# Patient Record
Sex: MALE | Race: WHITE | NOT HISPANIC OR LATINO | Employment: OTHER | URBAN - METROPOLITAN AREA
[De-identification: names, ages, dates, MRNs, and addresses within clinical notes are randomized per-mention and may not be internally consistent; named-entity substitution may affect disease eponyms.]

---

## 2020-07-10 ENCOUNTER — LAB (OUTPATIENT)
Dept: LAB | Facility: HOSPITAL | Age: 45
End: 2020-07-10

## 2020-07-10 ENCOUNTER — OFFICE VISIT (OUTPATIENT)
Dept: FAMILY MEDICINE CLINIC | Facility: CLINIC | Age: 45
End: 2020-07-10

## 2020-07-10 VITALS
DIASTOLIC BLOOD PRESSURE: 80 MMHG | HEART RATE: 88 BPM | BODY MASS INDEX: 28.66 KG/M2 | SYSTOLIC BLOOD PRESSURE: 132 MMHG | OXYGEN SATURATION: 96 % | WEIGHT: 200.2 LBS | HEIGHT: 70 IN

## 2020-07-10 DIAGNOSIS — Z00.00 PHYSICAL EXAM, ANNUAL: ICD-10-CM

## 2020-07-10 DIAGNOSIS — F33.1 MODERATE EPISODE OF RECURRENT MAJOR DEPRESSIVE DISORDER (HCC): ICD-10-CM

## 2020-07-10 DIAGNOSIS — F51.01 PRIMARY INSOMNIA: ICD-10-CM

## 2020-07-10 DIAGNOSIS — Z76.89 ENCOUNTER TO ESTABLISH CARE: Primary | ICD-10-CM

## 2020-07-10 DIAGNOSIS — K21.9 GASTROESOPHAGEAL REFLUX DISEASE, ESOPHAGITIS PRESENCE NOT SPECIFIED: ICD-10-CM

## 2020-07-10 DIAGNOSIS — Z11.59 ENCOUNTER FOR HEPATITIS C SCREENING TEST FOR LOW RISK PATIENT: ICD-10-CM

## 2020-07-10 DIAGNOSIS — E55.9 VITAMIN D DEFICIENCY: ICD-10-CM

## 2020-07-10 DIAGNOSIS — M17.0 PRIMARY OSTEOARTHRITIS OF BOTH KNEES: ICD-10-CM

## 2020-07-10 LAB
25(OH)D3 SERPL-MCNC: 18.7 NG/ML (ref 30–100)
ALBUMIN SERPL-MCNC: 4.7 G/DL (ref 3.5–5.2)
ALBUMIN/GLOB SERPL: 1.5 G/DL
ALP SERPL-CCNC: 104 U/L (ref 39–117)
ALT SERPL W P-5'-P-CCNC: 19 U/L (ref 1–41)
ANION GAP SERPL CALCULATED.3IONS-SCNC: 12.4 MMOL/L (ref 5–15)
AST SERPL-CCNC: 16 U/L (ref 1–40)
BASOPHILS # BLD AUTO: 0.05 10*3/MM3 (ref 0–0.2)
BASOPHILS NFR BLD AUTO: 0.6 % (ref 0–1.5)
BILIRUB SERPL-MCNC: 0.6 MG/DL (ref 0–1.2)
BUN SERPL-MCNC: 9 MG/DL (ref 6–20)
BUN/CREAT SERPL: 9.3 (ref 7–25)
CALCIUM SPEC-SCNC: 10.1 MG/DL (ref 8.6–10.5)
CHLORIDE SERPL-SCNC: 104 MMOL/L (ref 98–107)
CHOLEST SERPL-MCNC: 150 MG/DL (ref 0–200)
CO2 SERPL-SCNC: 25.6 MMOL/L (ref 22–29)
CREAT SERPL-MCNC: 0.97 MG/DL (ref 0.76–1.27)
DEPRECATED RDW RBC AUTO: 43 FL (ref 37–54)
EOSINOPHIL # BLD AUTO: 0.1 10*3/MM3 (ref 0–0.4)
EOSINOPHIL NFR BLD AUTO: 1.2 % (ref 0.3–6.2)
ERYTHROCYTE [DISTWIDTH] IN BLOOD BY AUTOMATED COUNT: 12.7 % (ref 12.3–15.4)
GFR SERPL CREATININE-BSD FRML MDRD: 84 ML/MIN/1.73
GLOBULIN UR ELPH-MCNC: 3.1 GM/DL
GLUCOSE SERPL-MCNC: 101 MG/DL (ref 65–99)
HCT VFR BLD AUTO: 50.3 % (ref 37.5–51)
HCV AB SER DONR QL: NORMAL
HDLC SERPL-MCNC: 27 MG/DL (ref 40–60)
HGB BLD-MCNC: 17.3 G/DL (ref 13–17.7)
IMM GRANULOCYTES # BLD AUTO: 0.03 10*3/MM3 (ref 0–0.05)
IMM GRANULOCYTES NFR BLD AUTO: 0.4 % (ref 0–0.5)
LDLC SERPL CALC-MCNC: 95 MG/DL (ref 0–100)
LDLC/HDLC SERPL: 3.52 {RATIO}
LYMPHOCYTES # BLD AUTO: 1.3 10*3/MM3 (ref 0.7–3.1)
LYMPHOCYTES NFR BLD AUTO: 16.1 % (ref 19.6–45.3)
MCH RBC QN AUTO: 31.6 PG (ref 26.6–33)
MCHC RBC AUTO-ENTMCNC: 34.4 G/DL (ref 31.5–35.7)
MCV RBC AUTO: 92 FL (ref 79–97)
MONOCYTES # BLD AUTO: 0.63 10*3/MM3 (ref 0.1–0.9)
MONOCYTES NFR BLD AUTO: 7.8 % (ref 5–12)
NEUTROPHILS NFR BLD AUTO: 5.94 10*3/MM3 (ref 1.7–7)
NEUTROPHILS NFR BLD AUTO: 73.9 % (ref 42.7–76)
NRBC BLD AUTO-RTO: 0 /100 WBC (ref 0–0.2)
PLATELET # BLD AUTO: 324 10*3/MM3 (ref 140–450)
PMV BLD AUTO: 9.9 FL (ref 6–12)
POTASSIUM SERPL-SCNC: 4.1 MMOL/L (ref 3.5–5.2)
PROT SERPL-MCNC: 7.8 G/DL (ref 6–8.5)
RBC # BLD AUTO: 5.47 10*6/MM3 (ref 4.14–5.8)
SODIUM SERPL-SCNC: 142 MMOL/L (ref 136–145)
TRIGL SERPL-MCNC: 140 MG/DL (ref 0–150)
TSH SERPL DL<=0.05 MIU/L-ACNC: 1.16 UIU/ML (ref 0.27–4.2)
VLDLC SERPL-MCNC: 28 MG/DL (ref 5–40)
WBC # BLD AUTO: 8.05 10*3/MM3 (ref 3.4–10.8)

## 2020-07-10 PROCEDURE — 80053 COMPREHEN METABOLIC PANEL: CPT

## 2020-07-10 PROCEDURE — 36415 COLL VENOUS BLD VENIPUNCTURE: CPT

## 2020-07-10 PROCEDURE — 82306 VITAMIN D 25 HYDROXY: CPT

## 2020-07-10 PROCEDURE — 85025 COMPLETE CBC W/AUTO DIFF WBC: CPT

## 2020-07-10 PROCEDURE — 86803 HEPATITIS C AB TEST: CPT

## 2020-07-10 PROCEDURE — 84443 ASSAY THYROID STIM HORMONE: CPT

## 2020-07-10 PROCEDURE — 80061 LIPID PANEL: CPT

## 2020-07-10 PROCEDURE — 99204 OFFICE O/P NEW MOD 45 MIN: CPT | Performed by: PHYSICIAN ASSISTANT

## 2020-07-10 RX ORDER — OMEPRAZOLE 20 MG/1
20 CAPSULE, DELAYED RELEASE ORAL DAILY
COMMUNITY
End: 2021-02-26 | Stop reason: SDUPTHER

## 2020-07-10 RX ORDER — TRAZODONE HYDROCHLORIDE 50 MG/1
50 TABLET ORAL NIGHTLY
Qty: 30 TABLET | Refills: 1 | Status: SHIPPED | OUTPATIENT
Start: 2020-07-10 | End: 2020-08-03

## 2020-07-10 NOTE — PATIENT INSTRUCTIONS
0.5 tablet of the zoloft (25 mg) with dinner for 3 days and then increase to 1 tablet.    Take 1 tablet of trazodone 50 mg at night for sleep.  Take 30 minutes prior to bed.  Give yourself 8 hours to sleep.      Major Depressive Disorder, Adult  Major depressive disorder (MDD) is a mental health condition. It may also be called clinical depression or unipolar depression. MDD usually causes feelings of sadness, hopelessness, or helplessness. MDD can also cause physical symptoms. It can interfere with work, school, relationships, and other everyday activities. MDD may be mild, moderate, or severe. It may occur once (single episode major depressive disorder) or it may occur multiple times (recurrent major depressive disorder).  What are the causes?  The exact cause of this condition is not known. MDD is most likely caused by a combination of things, which may include:  · Genetic factors. These are traits that are passed along from parent to child.  · Individual factors. Your personality, your behavior, and the way you handle your thoughts and feelings may contribute to MDD. This includes personality traits and behaviors learned from others.  · Physical factors, such as:  ? Differences in the part of your brain that controls emotion. This part of your brain may be different than it is in people who do not have MDD.  ? Long-term (chronic) medical or psychiatric illnesses.  · Social factors. Traumatic experiences or major life changes may play a role in the development of MDD.  What increases the risk?  This condition is more likely to develop in women. The following factors may also make you more likely to develop MDD:  · A family history of depression.  · Troubled family relationships.  · Abnormally low levels of certain brain chemicals.  · Traumatic events in childhood, especially abuse or the loss of a parent.  · Being under a lot of stress, or long-term stress, especially from upsetting life experiences or  losses.  · A history of:  ? Chronic physical illness.  ? Other mental health disorders.  ? Substance abuse.  · Poor living conditions.  · Experiencing social exclusion or discrimination on a regular basis.  What are the signs or symptoms?  The main symptoms of MDD typically include:  · Constant depressed or irritable mood.  · Loss of interest in things and activities.  MDD symptoms may also include:  · Sleeping or eating too much or too little.  · Unexplained weight change.  · Fatigue or low energy.  · Feelings of worthlessness or guilt.  · Difficulty thinking clearly or making decisions.  · Thoughts of suicide or of harming others.  · Physical agitation or weakness.  · Isolation.  Severe cases of MDD may also occur with other symptoms, such as:  · Delusions or hallucinations, in which you imagine things that are not real (psychotic depression).  · Low-level depression that lasts at least a year (chronic depression or persistent depressive disorder).  · Extreme sadness and hopelessness (melancholic depression).  · Trouble speaking and moving (catatonic depression).  How is this diagnosed?  This condition may be diagnosed based on:  · Your symptoms.  · Your medical history, including your mental health history. This may involve tests to evaluate your mental health. You may be asked questions about your lifestyle, including any drug and alcohol use, and how long you have had symptoms of MDD.  · A physical exam.  · Blood tests to rule out other conditions.  You must have a depressed mood and at least four other MDD symptoms most of the day, nearly every day in the same 2-week timeframe before your health care provider can confirm a diagnosis of MDD.  How is this treated?  This condition is usually treated by mental health professionals, such as psychologists, psychiatrists, and clinical social workers. You may need more than one type of treatment. Treatment may include:  · Psychotherapy. This is also called talk  therapy or counseling. Types of psychotherapy include:  ? Cognitive behavioral therapy (CBT). This type of therapy teaches you to recognize unhealthy feelings, thoughts, and behaviors, and replace them with positive thoughts and actions.  ? Interpersonal therapy (IPT). This helps you to improve the way you relate to and communicate with others.  ? Family therapy. This treatment includes members of your family.  · Medicine to treat anxiety and depression, or to help you control certain emotions and behaviors.  · Lifestyle changes, such as:  ? Limiting alcohol and drug use.  ? Exercising regularly.  ? Getting plenty of sleep.  ? Making healthy eating choices.  ? Spending more time outdoors.    Treatments involving stimulation of the brain can be used in situations with extremely severe symptoms, or when medicine or other therapies do not work over time. These treatments include electroconvulsive therapy, transcranial magnetic stimulation, and vagal nerve stimulation.  Follow these instructions at home:  Activity  · Return to your normal activities as told by your health care provider.  · Exercise regularly and spend time outdoors as told by your health care provider.  General instructions  · Take over-the-counter and prescription medicines only as told by your health care provider.  · Do not drink alcohol. If you drink alcohol, limit your alcohol intake to no more than 1 drink a day for nonpregnant women and 2 drinks a day for men. One drink equals 12 oz of beer, 5 oz of wine, or 1½ oz of hard liquor. Alcohol can affect any antidepressant medicines you are taking. Talk to your health care provider about your alcohol use.  · Eat a healthy diet and get plenty of sleep.  · Find activities that you enjoy doing, and make time to do them.  · Consider joining a support group. Your health care provider may be able to recommend a support group.  · Keep all follow-up visits as told by your health care provider. This is  important.  Where to find more information  National [place] [employer] Mental Illness  · www.sue.org  [place] National [employer] of Mental Health  · www.[facility].nih.gov  National Suicide Prevention Lifeline  · 5-242-318-TALK (5374). This is free, 24-hour help.  Contact a health care provider if:  · Your symptoms get worse.  · You develop new symptoms.  Get help right away if:  · You self-harm.  · You have serious thoughts about hurting yourself or others.  · You see, hear, taste, smell, or feel things that are not present (hallucinate).  This information is not intended to replace advice given to you by your health care provider. Make sure you discuss any questions you have with your health care provider.  Document Released: 04/14/2014 Document Revised: 11/30/2018 Document Reviewed: 06/28/2017  Elsevier Patient Education © 2020 [employer]

## 2020-07-10 NOTE — PROGRESS NOTES
Chief Complaint   Patient presents with   • [facility]   • Depression     states that he lost the love of his life and best friend this year, she broke his heart. States that he is very sad, having trouble sleeping, bad dreams. Pt states that he has barely been eating. Interested in possibly seeing a therapist to have someone to talk too       HPI     Brock Bateman is a 44 y.o. male who presents to establish care.  He has not been to a primary care provider in years.  He reports that he broke up with the love of his life 10 days ago.  She is also his best friend for the last 21 years.  He is severely depressed, anxious and not sleeping.  He denies suicidal ideation as he is a [place] and against suicide.  He is planning on going to his friend's house to visit and will be gone for the next 2 weeks.  His past medical history is significant for knee arthritis, GERD and mild depression.  Reports mild depression for entire life.  Has never been treated for this.  He was seen by orthopedic surgeon at one point, no recommendations for his knees at that time.  He reports stable currently.  Has GERD which is stable and well-controlled with omeprazole.      Chief Complaint   Patient presents with   • [facility]   • Depression     states that he lost the love of his life and best friend this year, she broke his heart. States that he is very sad, having trouble sleeping, bad dreams. Pt states that he has barely been eating. Interested in possibly seeing a therapist to have someone to talk too       History reviewed. No pertinent past medical history.    History reviewed. No pertinent surgical history.    Family History   Problem Relation Age of Onset   • Acute myelogenous leukemia Mother    • Diabetes Mother    • Lung cancer Father         smoker   • Kidney disease Maternal Grandmother    • Colon cancer Neg Hx    • Prostate cancer Neg Hx        Social History     Socioeconomic History   • Marital status: Single      Spouse name: Not on file   • Number of children: Not on file   • Years of education: Not on file   • Highest education level: Not on file   Tobacco Use   • Smoking status: Current Every Day Smoker     Packs/day: 1.00     Years: 15.00     Pack years: 15.00     Types: Cigarettes   • Smokeless tobacco: Former User     Quit date: 06/2020   Substance and Sexual Activity   • Alcohol use: Not Currently     Comment: stopped 3/07   • Drug use: Never   • Sexual activity: Not Currently       No Known Allergies    ROS    Review of Systems   Constitutional: Positive for appetite change and fatigue. Negative for activity change, chills, diaphoresis, fever, unexpected weight gain and unexpected weight loss.   HENT: Negative for congestion, dental problem, ear pain, hearing loss, nosebleeds, sinus pressure, sore throat and trouble swallowing.    Eyes: Negative for blurred vision, pain, redness and visual disturbance.   Respiratory: Negative for apnea, cough, chest tightness, shortness of breath and wheezing.    Cardiovascular: Negative for chest pain, palpitations and leg swelling.   Gastrointestinal: Negative for abdominal distention, abdominal pain, anal bleeding, blood in stool, constipation, diarrhea, nausea, vomiting, GERD and indigestion.   Endocrine: Negative for cold intolerance, heat intolerance, polydipsia, polyphagia and polyuria.   Genitourinary: Negative for decreased urine volume, difficulty urinating, dysuria, frequency, hematuria, urgency and urinary incontinence.   Musculoskeletal: Positive for arthralgias. Negative for gait problem, joint swelling and bursitis.   Skin: Negative for dry skin, rash, skin lesions and bruise.   Neurological: Negative for dizziness, tremors, seizures, syncope, speech difficulty, weakness, light-headedness, headache, memory problem and confusion.   Hematological: Does not bruise/bleed easily.   Psychiatric/Behavioral: Positive for agitation, behavioral problems, sleep disturbance,  "depressed mood and stress. Negative for decreased concentration, hallucinations, self-injury and suicidal ideas. The patient is nervous/anxious.        Vitals:    07/10/20 0925   BP: 132/80   BP Location: Left arm   Patient Position: Sitting   Cuff Size: Adult   Pulse: 88   SpO2: 96%   Weight: 90.8 kg (200 lb 3.2 oz)   Height: 177.8 cm (70\")     Body mass index is 28.73 kg/m².    Current Outpatient Medications on File Prior to Visit   Medication Sig Dispense Refill   • omeprazole (priLOSEC) 20 MG capsule Take 20 mg by mouth Daily.       No current facility-administered medications on file prior to visit.        No results found for this or any previous visit.    PE  Physical Exam   Constitutional: Vital signs are normal. He appears well-developed and well-nourished. He is active and cooperative. He does not appear ill. No distress. He appears overweight.   HENT:   Head: Normocephalic and atraumatic.   Eyes: EOM are normal.   Neck: Normal range of motion.   Cardiovascular: Normal rate, regular rhythm and normal heart sounds.   Pulmonary/Chest: Effort normal and breath sounds normal.   Musculoskeletal: Normal range of motion.   Neurological: He is alert.   Skin: Skin is warm. He is not diaphoretic. No erythema.   Psychiatric: His speech is normal. Judgment and thought content normal. His mood appears anxious. He is agitated. He is not actively hallucinating. Cognition and memory are normal. He exhibits a depressed mood.   Tearful during appointment He is attentive.       A/P    Brock was seen today for establish care and depression.    Diagnoses and all orders for this visit:    Encounter to establish care    Moderate episode of recurrent major depressive disorder (CMS/HCC)  -     Ambulatory Referral to Psychiatry  -     sertraline (ZOLOFT) 50 MG tablet; Take 1 tablet by mouth Daily.  Reports mild depression his entire life.  Reactive depression with recent break-up 10 days ago.  No suicidal ideation, Orthodox and " this goes against his beliefs.  Discussed ways to manage depression and anxiety.  Exercise, sleep, prayer, meditation, talking with friends/family and gratitude list.  Refer to psychiatry for counseling and medication management.  Start on zoloft 50 mg daily.  Return in 4 weeks.  Call sooner if he has concerns or questions.    Primary insomnia  -     traZODone (DESYREL) 50 MG tablet; Take 1 tablet by mouth Every Night.  Not sleeping at all.  Has tried melatonin without benefit.  Will trial trazodone 50 mg nightly.    Osteoarthritis of both knees  Stable currently.  Seen by orthopedic surgeon at one point, no recommendations then.    GERD  Stable, well-controlled with omeprazole.    Vitamin D deficiency  -     Vitamin D 25 Hydroxy; Future    Physical exam, annual  -     CBC Auto Differential; Future  -     Comprehensive Metabolic Panel; Future  -     TSH Rfx On Abnormal To Free T4; Future  -     Lipid Panel; Future  Return in 4 weeks for APE.  Labs ordered today.    Encounter for hepatitis C screening test for low risk patient  -     Hepatitis C Antibody; Future         Plan of care reviewed with patient at the conclusion of today's visit. Education was provided regarding diagnosis, management and any prescribed or recommended OTC medications.  Patient verbalizes understanding of and agreement with management plan.    Return in about 4 weeks (around 8/7/2020) for Annual physical.     Kelley Tang PA-C

## 2020-07-13 DIAGNOSIS — E55.9 VITAMIN D DEFICIENCY: Primary | ICD-10-CM

## 2020-08-03 ENCOUNTER — OFFICE VISIT (OUTPATIENT)
Dept: FAMILY MEDICINE CLINIC | Facility: CLINIC | Age: 45
End: 2020-08-03

## 2020-08-03 VITALS
BODY MASS INDEX: 28.55 KG/M2 | OXYGEN SATURATION: 98 % | SYSTOLIC BLOOD PRESSURE: 130 MMHG | HEIGHT: 70 IN | HEART RATE: 75 BPM | DIASTOLIC BLOOD PRESSURE: 82 MMHG | WEIGHT: 199.4 LBS

## 2020-08-03 DIAGNOSIS — F33.1 MODERATE EPISODE OF RECURRENT MAJOR DEPRESSIVE DISORDER (HCC): ICD-10-CM

## 2020-08-03 DIAGNOSIS — Z23 NEED FOR TDAP VACCINATION: ICD-10-CM

## 2020-08-03 DIAGNOSIS — Z23 NEED FOR PROPHYLACTIC VACCINATION AGAINST STREPTOCOCCUS PNEUMONIAE (PNEUMOCOCCUS): ICD-10-CM

## 2020-08-03 DIAGNOSIS — Z00.00 PHYSICAL EXAM, ANNUAL: Primary | ICD-10-CM

## 2020-08-03 PROCEDURE — 90472 IMMUNIZATION ADMIN EACH ADD: CPT | Performed by: PHYSICIAN ASSISTANT

## 2020-08-03 PROCEDURE — 90471 IMMUNIZATION ADMIN: CPT | Performed by: PHYSICIAN ASSISTANT

## 2020-08-03 PROCEDURE — 99396 PREV VISIT EST AGE 40-64: CPT | Performed by: PHYSICIAN ASSISTANT

## 2020-08-03 PROCEDURE — 90715 TDAP VACCINE 7 YRS/> IM: CPT | Performed by: PHYSICIAN ASSISTANT

## 2020-08-03 PROCEDURE — 90732 PPSV23 VACC 2 YRS+ SUBQ/IM: CPT | Performed by: PHYSICIAN ASSISTANT

## 2020-08-03 RX ORDER — SERTRALINE HYDROCHLORIDE 25 MG/1
25 TABLET, FILM COATED ORAL DAILY
Qty: 90 TABLET | Refills: 1 | Status: SHIPPED | OUTPATIENT
Start: 2020-08-03 | End: 2021-02-26

## 2020-08-03 NOTE — PROGRESS NOTES
Patient Care Team:  Kelley Tang PA-C as PCP - General (Physician Assistant)     Chief complaint: Patient is in today for a physical     Peter Catie Bateman is a 45 y.o. male who presents for his yearly physical exam.     Patient presents for annual physical exam.  He was recently started on sertraline 50 mg daily for depression and anxiety.  He was also started on trazodone 50 mg nightly to help with sleep.  He reports that he was not able to tolerate the trazodone as it made him more agitated at night.  He has had similar symptoms in the past with other sleep aids.  He is currently taking sertraline 25 mg because he felt too overmedicated at the higher dose.  This is working well for him and he is tolerating it well.  He is going to counseling and is finding this useful.  Overall he seems like he is doing much better and he states that he feels much better than when he was initially seen.  He has no suicidal ideation.  He is otherwise doing well and is healthy.  He is due for dental and eye exam.  He denies any concerning skin lesions.  Needs pneumonia vaccine.  Flu vaccine encouraged in the fall.       Review of Systems   Constitutional: Negative for chills, diaphoresis, fatigue and fever.   HENT: Negative for congestion, ear pain, hearing loss, postnasal drip, rhinorrhea and sore throat.    Eyes: Negative for blurred vision, pain and visual disturbance.   Respiratory: Negative for cough, shortness of breath and wheezing.    Cardiovascular: Negative for chest pain and leg swelling.   Gastrointestinal: Negative for abdominal pain, blood in stool, constipation, diarrhea, nausea, vomiting and indigestion.   Endocrine: Negative for polyuria.   Genitourinary: Negative for dysuria, flank pain and hematuria.   Musculoskeletal: Negative for arthralgias, gait problem and myalgias.   Skin: Negative for rash and skin lesions.   Neurological: Negative for dizziness, weakness, light-headedness, numbness and  headache.   Psychiatric/Behavioral: Positive for stress. Negative for self-injury, sleep disturbance, suicidal ideas and depressed mood. The patient is not nervous/anxious.         History  History reviewed. No pertinent past medical history.   History reviewed. No pertinent surgical history.   Allergies   Allergen Reactions   • Trazodone Other (See Comments)     Caused insomnia, blurred vision      Family History   Problem Relation Age of Onset   • Acute myelogenous leukemia Mother    • Diabetes Mother    • Lung cancer Father         smoker   • Kidney disease Maternal Grandmother    • Colon cancer Neg Hx    • Prostate cancer Neg Hx       Social History     Socioeconomic History   • Marital status: Single     Spouse name: Not on file   • Number of children: Not on file   • Years of education: Not on file   • Highest education level: Not on file   Tobacco Use   • Smoking status: Current Every Day Smoker     Packs/day: 1.00     Years: 15.00     Pack years: 15.00     Types: Cigarettes   • Smokeless tobacco: Former User     Quit date: 06/2020   Substance and Sexual Activity   • Alcohol use: Not Currently     Comment: stopped 3/07   • Drug use: Never   • Sexual activity: Not Currently      Current Outpatient Medications on File Prior to Visit   Medication Sig Dispense Refill   • cholecalciferol (VITAMIN D3) 1.25 MG (70576 UT) capsule Take 1 capsule by mouth 1 (One) Time Per Week. 12 capsule 1   • omeprazole (priLOSEC) 20 MG capsule Take 20 mg by mouth Daily.     • [DISCONTINUED] sertraline (ZOLOFT) 50 MG tablet Take 1 tablet by mouth Daily. 30 tablet 1   • [DISCONTINUED] traZODone (DESYREL) 50 MG tablet Take 1 tablet by mouth Every Night. 30 tablet 1     No current facility-administered medications on file prior to visit.        Results for orders placed or performed in visit on 07/10/20   CBC Auto Differential   Result Value Ref Range    WBC 8.05 3.40 - 10.80 10*3/mm3    RBC 5.47 4.14 - 5.80 10*6/mm3    Hemoglobin 17.3  13.0 - 17.7 g/dL    Hematocrit 50.3 37.5 - 51.0 %    MCV 92.0 79.0 - 97.0 fL    MCH 31.6 26.6 - 33.0 pg    MCHC 34.4 31.5 - 35.7 g/dL    RDW 12.7 12.3 - 15.4 %    RDW-SD 43.0 37.0 - 54.0 fl    MPV 9.9 6.0 - 12.0 fL    Platelets 324 140 - 450 10*3/mm3    Neutrophil % 73.9 42.7 - 76.0 %    Lymphocyte % 16.1 (L) 19.6 - 45.3 %    Monocyte % 7.8 5.0 - 12.0 %    Eosinophil % 1.2 0.3 - 6.2 %    Basophil % 0.6 0.0 - 1.5 %    Immature Grans % 0.4 0.0 - 0.5 %    Neutrophils, Absolute 5.94 1.70 - 7.00 10*3/mm3    Lymphocytes, Absolute 1.30 0.70 - 3.10 10*3/mm3    Monocytes, Absolute 0.63 0.10 - 0.90 10*3/mm3    Eosinophils, Absolute 0.10 0.00 - 0.40 10*3/mm3    Basophils, Absolute 0.05 0.00 - 0.20 10*3/mm3    Immature Grans, Absolute 0.03 0.00 - 0.05 10*3/mm3    nRBC 0.0 0.0 - 0.2 /100 WBC   Comprehensive Metabolic Panel   Result Value Ref Range    Glucose 101 (H) 65 - 99 mg/dL    BUN 9 6 - 20 mg/dL    Creatinine 0.97 0.76 - 1.27 mg/dL    Sodium 142 136 - 145 mmol/L    Potassium 4.1 3.5 - 5.2 mmol/L    Chloride 104 98 - 107 mmol/L    CO2 25.6 22.0 - 29.0 mmol/L    Calcium 10.1 8.6 - 10.5 mg/dL    Total Protein 7.8 6.0 - 8.5 g/dL    Albumin 4.70 3.50 - 5.20 g/dL    ALT (SGPT) 19 1 - 41 U/L    AST (SGOT) 16 1 - 40 U/L    Alkaline Phosphatase 104 39 - 117 U/L    Total Bilirubin 0.6 0.0 - 1.2 mg/dL    eGFR Non African Amer 84 >60 mL/min/1.73    Globulin 3.1 gm/dL    A/G Ratio 1.5 g/dL    BUN/Creatinine Ratio 9.3 7.0 - 25.0    Anion Gap 12.4 5.0 - 15.0 mmol/L   TSH Rfx On Abnormal To Free T4   Result Value Ref Range    TSH 1.160 0.270 - 4.200 uIU/mL   Lipid Panel   Result Value Ref Range    Total Cholesterol 150 0 - 200 mg/dL    Triglycerides 140 0 - 150 mg/dL    HDL Cholesterol 27 (L) 40 - 60 mg/dL    LDL Cholesterol  95 0 - 100 mg/dL    VLDL Cholesterol 28 5 - 40 mg/dL    LDL/HDL Ratio 3.52    Hepatitis C Antibody   Result Value Ref Range    Hepatitis C Ab Non-Reactive Non-Reactive   Vitamin D 25 Hydroxy   Result Value Ref Range     25 Hydroxy, Vitamin D 18.7 (L) 30.0 - 100.0 ng/ml       Health Maintenance   Topic Date Due   • TDAP/TD VACCINES (1 - Tdap) 1987   • PNEUMOCOCCAL VACCINE (19-64 MEDIUM RISK) (1 of 1 - PPSV23) 1995   • INFLUENZA VACCINE  2020   • ANNUAL PHYSICAL  2021   • HEPATITIS C SCREENING  Completed       Immunizations  Td/Tdap(Booster Q 10 yrs):  Prescribed  Flu (Yearly):  encouraged in Fall  Pneumovax (1 yr after Prevnar):  Prescribed  [id number] (1 yr after Pneumo):  N/A  Hep B:  Completed  Shringrix:  N/A}   Immunization History   Administered Date(s) Administered   • Pneumococcal Polysaccharide (PPSV23) 2020   • Tdap 2020         Diabetes:  No   Eye Exam: N/A   Foot Exam:  N/A  Obesity Counseling:  N/A  No results found for: HGBA1C, MICROALBUR    Patient's Body mass index is 28.61 kg/m². BMI is above normal parameters. Recommendations include: exercise counseling and nutrition counseling.      Colorectal Screening:  N/A  Pap:  N/A  Mammogram:  N/A  PSA(Over age 50):  N/A  US Aorta (For male smokers, age 65):  N/A  CT for Smoker (Age 55-75, 30pk yr):  N/A  Bone Density/DEXA:  N/A  Hep C ( 8453-4645):  Ordered Today      Results for orders placed or performed in visit on 07/10/20   CBC Auto Differential   Result Value Ref Range    WBC 8.05 3.40 - 10.80 10*3/mm3    RBC 5.47 4.14 - 5.80 10*6/mm3    Hemoglobin 17.3 13.0 - 17.7 g/dL    Hematocrit 50.3 37.5 - 51.0 %    MCV 92.0 79.0 - 97.0 fL    MCH 31.6 26.6 - 33.0 pg    MCHC 34.4 31.5 - 35.7 g/dL    RDW 12.7 12.3 - 15.4 %    RDW-SD 43.0 37.0 - 54.0 fl    MPV 9.9 6.0 - 12.0 fL    Platelets 324 140 - 450 10*3/mm3    Neutrophil % 73.9 42.7 - 76.0 %    Lymphocyte % 16.1 (L) 19.6 - 45.3 %    Monocyte % 7.8 5.0 - 12.0 %    Eosinophil % 1.2 0.3 - 6.2 %    Basophil % 0.6 0.0 - 1.5 %    Immature Grans % 0.4 0.0 - 0.5 %    Neutrophils, Absolute 5.94 1.70 - 7.00 10*3/mm3    Lymphocytes, Absolute 1.30 0.70 - 3.10 10*3/mm3    Monocytes, Absolute 0.63  "0.10 - 0.90 10*3/mm3    Eosinophils, Absolute 0.10 0.00 - 0.40 10*3/mm3    Basophils, Absolute 0.05 0.00 - 0.20 10*3/mm3    Immature Grans, Absolute 0.03 0.00 - 0.05 10*3/mm3    nRBC 0.0 0.0 - 0.2 /100 WBC   Comprehensive Metabolic Panel   Result Value Ref Range    Glucose 101 (H) 65 - 99 mg/dL    BUN 9 6 - 20 mg/dL    Creatinine 0.97 0.76 - 1.27 mg/dL    Sodium 142 136 - 145 mmol/L    Potassium 4.1 3.5 - 5.2 mmol/L    Chloride 104 98 - 107 mmol/L    CO2 25.6 22.0 - 29.0 mmol/L    Calcium 10.1 8.6 - 10.5 mg/dL    Total Protein 7.8 6.0 - 8.5 g/dL    Albumin 4.70 3.50 - 5.20 g/dL    ALT (SGPT) 19 1 - 41 U/L    AST (SGOT) 16 1 - 40 U/L    Alkaline Phosphatase 104 39 - 117 U/L    Total Bilirubin 0.6 0.0 - 1.2 mg/dL    eGFR Non African Amer 84 >60 mL/min/1.73    Globulin 3.1 gm/dL    A/G Ratio 1.5 g/dL    BUN/Creatinine Ratio 9.3 7.0 - 25.0    Anion Gap 12.4 5.0 - 15.0 mmol/L   TSH Rfx On Abnormal To Free T4   Result Value Ref Range    TSH 1.160 0.270 - 4.200 uIU/mL   Lipid Panel   Result Value Ref Range    Total Cholesterol 150 0 - 200 mg/dL    Triglycerides 140 0 - 150 mg/dL    HDL Cholesterol 27 (L) 40 - 60 mg/dL    LDL Cholesterol  95 0 - 100 mg/dL    VLDL Cholesterol 28 5 - 40 mg/dL    LDL/HDL Ratio 3.52    Hepatitis C Antibody   Result Value Ref Range    Hepatitis C Ab Non-Reactive Non-Reactive   Vitamin D 25 Hydroxy   Result Value Ref Range    25 Hydroxy, Vitamin D 18.7 (L) 30.0 - 100.0 ng/ml            Vitals:    08/03/20 1205   BP: 130/82   Pulse: 75   SpO2: 98%   Weight: 90.4 kg (199 lb 6.4 oz)   Height: 177.8 cm (70\")       Body mass index is 28.61 kg/m².    Physical Exam   Constitutional: He is oriented to person, place, and time. Vital signs are normal. He appears well-developed and well-nourished. He is active and cooperative. He does not appear ill. No distress. He appears overweight.   HENT:   Head: Normocephalic and atraumatic.   Right Ear: Hearing, tympanic membrane, external ear and ear canal normal. "   Left Ear: Hearing, tympanic membrane, external ear and ear canal normal.   Nose: Nose normal. Right sinus exhibits no maxillary sinus tenderness and no frontal sinus tenderness. Left sinus exhibits no maxillary sinus tenderness and no frontal sinus tenderness.   Mouth/Throat: Uvula is midline, oropharynx is clear and moist and mucous membranes are normal.   Eyes: Conjunctivae, EOM and lids are normal.   Neck: Trachea normal, normal range of motion and phonation normal. No thyroid mass and no thyromegaly present.   Cardiovascular: Normal rate, regular rhythm and normal heart sounds.   Pulmonary/Chest: Effort normal and breath sounds normal.   Abdominal: Soft. Normal appearance and bowel sounds are normal. He exhibits no distension. There is no tenderness. There is no rigidity, no guarding and no CVA tenderness.   Musculoskeletal: Normal range of motion. He exhibits no edema.   Lymphadenopathy:     He has no cervical adenopathy.        Right cervical: No superficial cervical adenopathy present.       Left cervical: No superficial cervical adenopathy present.   Neurological: He is alert and oriented to person, place, and time. He has normal strength and normal reflexes. Coordination and gait normal.   CN grossly intact   Skin: Skin is warm and intact. No rash noted. He is not diaphoretic. No cyanosis or erythema. Nails show no clubbing.   Psychiatric: He has a normal mood and affect. His speech is normal and behavior is normal. Judgment and thought content normal. He is not actively hallucinating. Cognition and memory are normal. He is attentive.   Vitals reviewed.          Counseling provided on diet and nutrition, exercise, weight management, supplements, mental health concerns, insomnia, anxiety and flu prevention.    Brock was seen today for annual exam.    Diagnoses and all orders for this visit:    Physical exam, annual  PE is unremarkable  Preventative labs reviewed with patient  Dentist - will  schedule  Ophthalmologist - will schedule  Dermatologist - no concerning skin lesions/moles  Flu vaccine encouraged in Fall, Tdap and pneumonia 23 today    Moderate episode of recurrent major depressive disorder (CMS/HCC)  -     sertraline (ZOLOFT) 25 MG tablet; Take 1 tablet by mouth Daily.  Improved with sertraline 25 mg daily.  Going to counseling.  Reports significant improvement in mood and is doing well overall.    Need for prophylactic vaccination against Streptococcus pneumoniae (pneumococcus)  -     Pneumococcal Polysaccharide Vaccine 23-Valent Greater Than or Equal To 3yo Subcutaneous / IM    Need for Tdap vaccination  -     Tdap Vaccine Greater Than or Equal To 6yo IM       Kelley Tang PA-C   8/3/2020   20:01

## 2021-02-26 ENCOUNTER — OFFICE VISIT (OUTPATIENT)
Dept: FAMILY MEDICINE CLINIC | Facility: CLINIC | Age: 46
End: 2021-02-26

## 2021-02-26 VITALS
SYSTOLIC BLOOD PRESSURE: 130 MMHG | HEART RATE: 84 BPM | OXYGEN SATURATION: 98 % | DIASTOLIC BLOOD PRESSURE: 80 MMHG | WEIGHT: 198 LBS | BODY MASS INDEX: 28.35 KG/M2 | HEIGHT: 70 IN

## 2021-02-26 DIAGNOSIS — F41.9 ANXIETY: Primary | ICD-10-CM

## 2021-02-26 DIAGNOSIS — E55.9 VITAMIN D DEFICIENCY: ICD-10-CM

## 2021-02-26 DIAGNOSIS — K21.9 GASTROESOPHAGEAL REFLUX DISEASE, UNSPECIFIED WHETHER ESOPHAGITIS PRESENT: ICD-10-CM

## 2021-02-26 PROCEDURE — 99213 OFFICE O/P EST LOW 20 MIN: CPT | Performed by: PHYSICIAN ASSISTANT

## 2021-02-26 RX ORDER — OMEPRAZOLE 20 MG/1
20 CAPSULE, DELAYED RELEASE ORAL DAILY
Qty: 30 CAPSULE | Refills: 1 | Status: SHIPPED | OUTPATIENT
Start: 2021-02-26 | End: 2021-08-24 | Stop reason: SDUPTHER

## 2021-02-26 NOTE — PROGRESS NOTES
Chief Complaint   Patient presents with   • Anxiety     Pt is here for a follow up. Pt states he was going through a lot at his last visit but he has been doing much better.  Pt states he was taking Zoloft for a short period and has since stopped and is doing much better.        HPI      Brock Bateman is a 45 y.o. male who presents for Anxiety (Pt is here for a follow up. Pt states he was going through a lot at his last visit but he has been doing much better.  Pt states he was taking Zoloft for a short period and has since stopped and is doing much better. )    Patient presents today for routine follow-up of anxiety.  He reports that he is doing much better today.  He discontinued the Zoloft and states that his anxiety has been okay without it.  He denies any issues with either depression or anxiety or sleep.  He completed his prescription for vitamin D but has not started an over-the-counter medication.  He has episodes of reflux and takes omeprazole but nothing concerning and nothing that is bothering him today.  Overall patient is doing well today.    History reviewed. No pertinent past medical history.    History reviewed. No pertinent surgical history.    Family History   Problem Relation Age of Onset   • Acute myelogenous leukemia Mother    • Diabetes Mother    • Lung cancer Father         smoker   • Kidney disease Maternal Grandmother    • Colon cancer Neg Hx    • Prostate cancer Neg Hx        Social History     Socioeconomic History   • Marital status: Single     Spouse name: Not on file   • Number of children: Not on file   • Years of education: Not on file   • Highest education level: Not on file   Tobacco Use   • Smoking status: Current Every Day Smoker     Packs/day: 1.00     Years: 15.00     Pack years: 15.00     Types: Cigarettes   • Smokeless tobacco: Former User     Quit date: 06/2020   Substance and Sexual Activity   • Alcohol use: Not Currently     Comment: stopped 3/07   • Drug use: Never  "  • Sexual activity: Not Currently       Allergies   Allergen Reactions   • Trazodone Other (See Comments)     Caused insomnia, blurred vision       ROS    Review of Systems   Constitutional: Negative for chills and fever.   Gastrointestinal: Positive for GERD.   Psychiatric/Behavioral: Negative for agitation, sleep disturbance, suicidal ideas, depressed mood and stress. The patient is not nervous/anxious.        Vitals:    02/26/21 1012   BP: 130/80   Pulse: 84   SpO2: 98%   Weight: 89.8 kg (198 lb)   Height: 177.8 cm (70\")     Body mass index is 28.41 kg/m².    Current Outpatient Medications on File Prior to Visit   Medication Sig Dispense Refill   • [DISCONTINUED] omeprazole (priLOSEC) 20 MG capsule Take 20 mg by mouth Daily.     • [DISCONTINUED] cholecalciferol (VITAMIN D3) 1.25 MG (60297 UT) capsule Take 1 capsule by mouth 1 (One) Time Per Week. 12 capsule 1   • [DISCONTINUED] sertraline (ZOLOFT) 25 MG tablet Take 1 tablet by mouth Daily. 90 tablet 1     No current facility-administered medications on file prior to visit.        Results for orders placed or performed in visit on 07/10/20   CBC Auto Differential    Specimen: Blood   Result Value Ref Range    WBC 8.05 3.40 - 10.80 10*3/mm3    RBC 5.47 4.14 - 5.80 10*6/mm3    Hemoglobin 17.3 13.0 - 17.7 g/dL    Hematocrit 50.3 37.5 - 51.0 %    MCV 92.0 79.0 - 97.0 fL    MCH 31.6 26.6 - 33.0 pg    MCHC 34.4 31.5 - 35.7 g/dL    RDW 12.7 12.3 - 15.4 %    RDW-SD 43.0 37.0 - 54.0 fl    MPV 9.9 6.0 - 12.0 fL    Platelets 324 140 - 450 10*3/mm3    Neutrophil % 73.9 42.7 - 76.0 %    Lymphocyte % 16.1 (L) 19.6 - 45.3 %    Monocyte % 7.8 5.0 - 12.0 %    Eosinophil % 1.2 0.3 - 6.2 %    Basophil % 0.6 0.0 - 1.5 %    Immature Grans % 0.4 0.0 - 0.5 %    Neutrophils, Absolute 5.94 1.70 - 7.00 10*3/mm3    Lymphocytes, Absolute 1.30 0.70 - 3.10 10*3/mm3    Monocytes, Absolute 0.63 0.10 - 0.90 10*3/mm3    Eosinophils, Absolute 0.10 0.00 - 0.40 10*3/mm3    Basophils, Absolute 0.05 " 0.00 - 0.20 10*3/mm3    Immature Grans, Absolute 0.03 0.00 - 0.05 10*3/mm3    nRBC 0.0 0.0 - 0.2 /100 WBC   Comprehensive Metabolic Panel    Specimen: Blood   Result Value Ref Range    Glucose 101 (H) 65 - 99 mg/dL    BUN 9 6 - 20 mg/dL    Creatinine 0.97 0.76 - 1.27 mg/dL    Sodium 142 136 - 145 mmol/L    Potassium 4.1 3.5 - 5.2 mmol/L    Chloride 104 98 - 107 mmol/L    CO2 25.6 22.0 - 29.0 mmol/L    Calcium 10.1 8.6 - 10.5 mg/dL    Total Protein 7.8 6.0 - 8.5 g/dL    Albumin 4.70 3.50 - 5.20 g/dL    ALT (SGPT) 19 1 - 41 U/L    AST (SGOT) 16 1 - 40 U/L    Alkaline Phosphatase 104 39 - 117 U/L    Total Bilirubin 0.6 0.0 - 1.2 mg/dL    eGFR Non African Amer 84 >60 mL/min/1.73    Globulin 3.1 gm/dL    A/G Ratio 1.5 g/dL    BUN/Creatinine Ratio 9.3 7.0 - 25.0    Anion Gap 12.4 5.0 - 15.0 mmol/L   TSH Rfx On Abnormal To Free T4    Specimen: Blood   Result Value Ref Range    TSH 1.160 0.270 - 4.200 uIU/mL   Lipid Panel    Specimen: Blood   Result Value Ref Range    Total Cholesterol 150 0 - 200 mg/dL    Triglycerides 140 0 - 150 mg/dL    HDL Cholesterol 27 (L) 40 - 60 mg/dL    LDL Cholesterol  95 0 - 100 mg/dL    VLDL Cholesterol 28 5 - 40 mg/dL    LDL/HDL Ratio 3.52    Hepatitis C Antibody    Specimen: Blood   Result Value Ref Range    Hepatitis C Ab Non-Reactive Non-Reactive   Vitamin D 25 Hydroxy    Specimen: Blood   Result Value Ref Range    25 Hydroxy, Vitamin D 18.7 (L) 30.0 - 100.0 ng/ml       PE    Physical Exam  Vitals signs reviewed.   Constitutional:       General: He is not in acute distress.     Appearance: Normal appearance. He is well-developed and normal weight. He is not ill-appearing or diaphoretic.   HENT:      Head: Normocephalic and atraumatic.   Eyes:      Extraocular Movements: Extraocular movements intact.      Conjunctiva/sclera: Conjunctivae normal.   Neck:      Musculoskeletal: Normal range of motion.   Pulmonary:      Effort: No respiratory distress.   Musculoskeletal: Normal range of motion.    Neurological:      General: No focal deficit present.      Mental Status: He is alert.   Psychiatric:         Attention and Perception: Attention and perception normal. He is attentive.         Mood and Affect: Mood and affect normal.         Speech: Speech normal.         Behavior: Behavior normal. Behavior is cooperative.         Thought Content: Thought content normal.         Cognition and Memory: Cognition and memory normal.         Judgment: Judgment normal.          A/P    Diagnoses and all orders for this visit:    1. Anxiety (Primary)  Reports improvement in anxiety.  Has since stopped taking zoloft and is doing well without it.    2. Vitamin D deficiency  Recommend vitamin D 2000 units daily.    3. Gastroesophageal reflux disease, unspecified whether esophagitis present  -     omeprazole (priLOSEC) 20 MG capsule; Take 1 capsule by mouth Daily.  Dispense: 30 capsule; Refill: 1  Takes episodically when he has issues with reflux.       Plan of care reviewed with patient at the conclusion of today's visit. Education was provided regarding diagnosis, management and any prescribed or recommended OTC medications.  Patient verbalizes understanding of and agreement with management plan.    Return in about 1 year (around 2/27/2022) for Annual physical.     Kelley Tang PA-C

## 2021-08-24 DIAGNOSIS — K21.9 GASTROESOPHAGEAL REFLUX DISEASE, UNSPECIFIED WHETHER ESOPHAGITIS PRESENT: ICD-10-CM

## 2021-08-24 RX ORDER — OMEPRAZOLE 20 MG/1
20 CAPSULE, DELAYED RELEASE ORAL DAILY
Qty: 30 CAPSULE | Refills: 1 | Status: SHIPPED | OUTPATIENT
Start: 2021-08-24 | End: 2021-09-03 | Stop reason: SDUPTHER

## 2021-08-24 NOTE — TELEPHONE ENCOUNTER
Caller: Brock Bateman    Relationship: Self    Best call back number: [phone]    Medication needed:   Requested Prescriptions     Pending Prescriptions Disp Refills   • omeprazole (priLOSEC) 20 MG capsule 30 capsule 1     Sig: Take 1 capsule by mouth Daily.       When do you need the refill by: 8/24/21    What additional details did the patient provide when requesting the medication: PATIENT STATED THAT HIS INSURANCE PAYS FOR IT AND HE GETS THIS FOR FREE.    Does the patient have less than a 3 day supply:  [x] Yes  [] No    What [facility] the patient[facility] [facility] [place] [address] [facility] - [phone] [facility] [phone] FX

## 2021-09-03 ENCOUNTER — OFFICE VISIT (OUTPATIENT)
Dept: FAMILY MEDICINE CLINIC | Facility: CLINIC | Age: 46
End: 2021-09-03

## 2021-09-03 VITALS
HEART RATE: 90 BPM | DIASTOLIC BLOOD PRESSURE: 80 MMHG | BODY MASS INDEX: 29.18 KG/M2 | HEIGHT: 70 IN | SYSTOLIC BLOOD PRESSURE: 130 MMHG | WEIGHT: 203.8 LBS | OXYGEN SATURATION: 98 % | TEMPERATURE: 97 F

## 2021-09-03 DIAGNOSIS — M54.2 NECK ACHE: Primary | ICD-10-CM

## 2021-09-03 DIAGNOSIS — H69.93 DISORDER OF BOTH EUSTACHIAN TUBES: ICD-10-CM

## 2021-09-03 DIAGNOSIS — K21.9 GASTROESOPHAGEAL REFLUX DISEASE, UNSPECIFIED WHETHER ESOPHAGITIS PRESENT: ICD-10-CM

## 2021-09-03 PROCEDURE — 99214 OFFICE O/P EST MOD 30 MIN: CPT | Performed by: PHYSICIAN ASSISTANT

## 2021-09-03 RX ORDER — CYCLOBENZAPRINE HCL 10 MG
10 TABLET ORAL NIGHTLY PRN
Qty: 30 TABLET | Refills: 0 | Status: SHIPPED | OUTPATIENT
Start: 2021-09-03 | End: 2021-11-29

## 2021-09-03 RX ORDER — OMEPRAZOLE 20 MG/1
20 CAPSULE, DELAYED RELEASE ORAL DAILY
Qty: 90 CAPSULE | Refills: 1 | Status: SHIPPED | OUTPATIENT
Start: 2021-09-03 | End: 2022-12-19

## 2021-09-03 RX ORDER — FLUTICASONE PROPIONATE 50 MCG
2 SPRAY, SUSPENSION (ML) NASAL DAILY
Qty: 18.2 ML | Refills: 5 | Status: SHIPPED | OUTPATIENT
Start: 2021-09-03 | End: 2022-08-09

## 2021-09-03 NOTE — PROGRESS NOTES
Chief Complaint   Patient presents with   • Neck Pain   • Earache     when hearing nose, it feels like a rattle sound       HPI      Brock Bateman is a 46 y.o. male who presents for Neck Pain and Earache (when hearing nose, it feels like a rattle sound)    Patient reports ongoing chronic neck pain.  Had massage yesterday and experienced sharp shooting pain into head.  This has resolved.  He was told by masseuse that he might have a bulging disc.  He denies any radicular pain, paresthesia or weakness in his arms.  He reports having ongoing tension and stiffness in his neck.    Patient also reports chronically diminished hearing in his ears at times.  He is taking an OTC antihistamine.  He is not using Flonase.  He denies ear pain.  His hearing is normal at times as well.    Patient is taking omeprazole 20 mg as needed for reflux and this is working well.    History reviewed. No pertinent past medical history.    History reviewed. No pertinent surgical history.    Family History   Problem Relation Age of Onset   • Acute myelogenous leukemia Mother    • Diabetes Mother    • Lung cancer Father         smoker   • Kidney disease Maternal Grandmother    • Colon cancer Neg Hx    • Prostate cancer Neg Hx        Social History     Socioeconomic History   • Marital status: Single     Spouse name: Not on file   • Number of children: Not on file   • Years of education: Not on file   • Highest education level: Not on file   Tobacco Use   • Smoking status: Current Every Day Smoker     Packs/day: 1.00     Years: 15.00     Pack years: 15.00     Types: Cigarettes   • Smokeless tobacco: Former User     Quit date: 06/2020   Substance and Sexual Activity   • Alcohol use: Not Currently     Comment: stopped 3/07   • Drug use: Never   • Sexual activity: Not Currently       Allergies   Allergen Reactions   • Trazodone Other (See Comments)     Caused insomnia, blurred vision       ROS    Review of Systems   HENT: Negative for  "congestion, ear pain, postnasal drip, rhinorrhea, sinus pressure, sore throat and tinnitus.         Hearing changes   Respiratory: Negative for cough and shortness of breath.    Musculoskeletal: Positive for myalgias and neck pain.   Neurological: Negative for dizziness, weakness, numbness and headache.       Vitals:    09/03/21 1047   BP: 130/80   Pulse: 90   Temp: 97 °F (36.1 °C)   SpO2: 98%   Weight: 92.4 kg (203 lb 12.8 oz)   Height: 177.8 cm (70\")     Body mass index is 29.24 kg/m².    Current Outpatient Medications on File Prior to Visit   Medication Sig Dispense Refill   • [DISCONTINUED] omeprazole (priLOSEC) 20 MG capsule Take 1 capsule by mouth Daily. 30 capsule 1     No current facility-administered medications on file prior to visit.       Results for orders placed or performed in visit on 07/10/20   CBC Auto Differential    Specimen: Blood   Result Value Ref Range    WBC 8.05 3.40 - 10.80 10*3/mm3    RBC 5.47 4.14 - 5.80 10*6/mm3    Hemoglobin 17.3 13.0 - 17.7 g/dL    Hematocrit 50.3 37.5 - 51.0 %    MCV 92.0 79.0 - 97.0 fL    MCH 31.6 26.6 - 33.0 pg    MCHC 34.4 31.5 - 35.7 g/dL    RDW 12.7 12.3 - 15.4 %    RDW-SD 43.0 37.0 - 54.0 fl    MPV 9.9 6.0 - 12.0 fL    Platelets 324 140 - 450 10*3/mm3    Neutrophil % 73.9 42.7 - 76.0 %    Lymphocyte % 16.1 (L) 19.6 - 45.3 %    Monocyte % 7.8 5.0 - 12.0 %    Eosinophil % 1.2 0.3 - 6.2 %    Basophil % 0.6 0.0 - 1.5 %    Immature Grans % 0.4 0.0 - 0.5 %    Neutrophils, Absolute 5.94 1.70 - 7.00 10*3/mm3    Lymphocytes, Absolute 1.30 0.70 - 3.10 10*3/mm3    Monocytes, Absolute 0.63 0.10 - 0.90 10*3/mm3    Eosinophils, Absolute 0.10 0.00 - 0.40 10*3/mm3    Basophils, Absolute 0.05 0.00 - 0.20 10*3/mm3    Immature Grans, Absolute 0.03 0.00 - 0.05 10*3/mm3    nRBC 0.0 0.0 - 0.2 /100 WBC   Comprehensive Metabolic Panel    Specimen: Blood   Result Value Ref Range    Glucose 101 (H) 65 - 99 mg/dL    BUN 9 6 - 20 mg/dL    Creatinine 0.97 0.76 - 1.27 mg/dL    Sodium 142 " 136 - 145 mmol/L    Potassium 4.1 3.5 - 5.2 mmol/L    Chloride 104 98 - 107 mmol/L    CO2 25.6 22.0 - 29.0 mmol/L    Calcium 10.1 8.6 - 10.5 mg/dL    Total Protein 7.8 6.0 - 8.5 g/dL    Albumin 4.70 3.50 - 5.20 g/dL    ALT (SGPT) 19 1 - 41 U/L    AST (SGOT) 16 1 - 40 U/L    Alkaline Phosphatase 104 39 - 117 U/L    Total Bilirubin 0.6 0.0 - 1.2 mg/dL    eGFR Non African Amer 84 >60 mL/min/1.73    Globulin 3.1 gm/dL    A/G Ratio 1.5 g/dL    BUN/Creatinine Ratio 9.3 7.0 - 25.0    Anion Gap 12.4 5.0 - 15.0 mmol/L   TSH Rfx On Abnormal To Free T4    Specimen: Blood   Result Value Ref Range    TSH 1.160 0.270 - 4.200 uIU/mL   Lipid Panel    Specimen: Blood   Result Value Ref Range    Total Cholesterol 150 0 - 200 mg/dL    Triglycerides 140 0 - 150 mg/dL    HDL Cholesterol 27 (L) 40 - 60 mg/dL    LDL Cholesterol  95 0 - 100 mg/dL    VLDL Cholesterol 28 5 - 40 mg/dL    LDL/HDL Ratio 3.52    Hepatitis C Antibody    Specimen: Blood   Result Value Ref Range    Hepatitis C Ab Non-Reactive Non-Reactive   Vitamin D 25 Hydroxy    Specimen: Blood   Result Value Ref Range    25 Hydroxy, Vitamin D 18.7 (L) 30.0 - 100.0 ng/ml       PE    Physical Exam  Vitals reviewed.   Constitutional:       General: He is not in acute distress.     Appearance: Normal appearance. He is well-developed and normal weight. He is not ill-appearing or diaphoretic.   HENT:      Head: Normocephalic and atraumatic.      Right Ear: Hearing, tympanic membrane, ear canal and external ear normal. There is no impacted cerumen.      Left Ear: Hearing, tympanic membrane, ear canal and external ear normal. There is no impacted cerumen.   Eyes:      Extraocular Movements: Extraocular movements intact.      Conjunctiva/sclera: Conjunctivae normal.   Cardiovascular:      Rate and Rhythm: Normal rate and regular rhythm.      Heart sounds: Normal heart sounds. No murmur heard.   No friction rub. No gallop.    Pulmonary:      Effort: Pulmonary effort is normal. No  respiratory distress.      Breath sounds: Normal breath sounds.   Musculoskeletal:         General: Normal range of motion.      Cervical back: Normal range of motion.        Back:       Right lower leg: No edema.      Left lower leg: No edema.   Skin:     General: Skin is warm.      Findings: No erythema or rash.   Neurological:      General: No focal deficit present.      Mental Status: He is alert and oriented to person, place, and time.   Psychiatric:         Attention and Perception: He is attentive.         Mood and Affect: Mood normal.         Speech: Speech normal.         Behavior: Behavior normal. Behavior is cooperative.         Thought Content: Thought content normal.         Judgment: Judgment normal.          A/P    Diagnoses and all orders for this visit:    1. Neck ache (Primary)  -     cyclobenzaprine (FLEXERIL) 10 MG tablet; Take 1 tablet by mouth At Night As Needed for Muscle Spasms.  Dispense: 30 tablet; Refill: 0  Will prescribe flexeril prn for muscle ache.  Patient aware this may be sedating.    2. Disorder of both eustachian tubes  -     fluticasone (Flonase) 50 MCG/ACT nasal spray; 2 sprays into the nostril(s) as directed by provider Daily.  Dispense: 18.2 mL; Refill: 5  Continue anti-histamine daily.  Add flonase daily.  Discussed auto-insufflation.  If symptoms worsen or do not improve, will contact office.    3. Gastroesophageal reflux disease, unspecified whether esophagitis present  -     omeprazole (priLOSEC) 20 MG capsule; Take 1 capsule by mouth Daily.  Dispense: 90 capsule; Refill: 1  Taking prn and this works well.       Plan of care reviewed with patient at the conclusion of today's visit. Education was provided regarding diagnosis, management and any prescribed or recommended OTC medications.  Patient verbalizes understanding of and agreement with management plan.    No follow-ups on file.     Kelley Tang PA-C

## 2021-11-24 ENCOUNTER — TELEPHONE (OUTPATIENT)
Dept: FAMILY MEDICINE CLINIC | Facility: CLINIC | Age: 46
End: 2021-11-24

## 2021-11-24 NOTE — TELEPHONE ENCOUNTER
Called and spoke to pt. Wanting to change careers to OTR . However wants to discuss blood pressure and providers thoughts. Appt made for 11/29/21

## 2021-11-24 NOTE — TELEPHONE ENCOUNTER
PATIENT HAS CALLED REQUESTING A CALL BACK FROM PCP TO DISCUSS HIS HEALTH BEFORE MAKING A CAREER CHANGE.  PATIENT IS GOING TO PURSUE GETTING A CD[employer] LICENSE.    CALL BACK NUMBER [phone]

## 2021-11-29 ENCOUNTER — OFFICE VISIT (OUTPATIENT)
Dept: FAMILY MEDICINE CLINIC | Facility: CLINIC | Age: 46
End: 2021-11-29

## 2021-11-29 VITALS
SYSTOLIC BLOOD PRESSURE: 134 MMHG | WEIGHT: 205.8 LBS | HEART RATE: 90 BPM | BODY MASS INDEX: 29.46 KG/M2 | OXYGEN SATURATION: 97 % | HEIGHT: 70 IN | DIASTOLIC BLOOD PRESSURE: 78 MMHG | TEMPERATURE: 97.7 F

## 2021-11-29 DIAGNOSIS — Z13.1 SCREENING FOR DIABETES MELLITUS: ICD-10-CM

## 2021-11-29 DIAGNOSIS — E55.9 VITAMIN D DEFICIENCY: ICD-10-CM

## 2021-11-29 DIAGNOSIS — Z13.29 SCREENING FOR THYROID DISORDER: ICD-10-CM

## 2021-11-29 DIAGNOSIS — K21.9 GASTROESOPHAGEAL REFLUX DISEASE, UNSPECIFIED WHETHER ESOPHAGITIS PRESENT: ICD-10-CM

## 2021-11-29 DIAGNOSIS — Z00.00 PHYSICAL EXAM, ANNUAL: Primary | ICD-10-CM

## 2021-11-29 DIAGNOSIS — Z72.0 TOBACCO ABUSE: ICD-10-CM

## 2021-11-29 DIAGNOSIS — Z13.220 SCREENING FOR CHOLESTEROL LEVEL: ICD-10-CM

## 2021-11-29 DIAGNOSIS — Z12.11 SCREEN FOR COLON CANCER: ICD-10-CM

## 2021-11-29 DIAGNOSIS — Z13.0 SCREENING FOR DEFICIENCY ANEMIA: ICD-10-CM

## 2021-11-29 PROBLEM — F33.1 MODERATE EPISODE OF RECURRENT MAJOR DEPRESSIVE DISORDER: Status: RESOLVED | Noted: 2020-07-10 | Resolved: 2021-11-29

## 2021-11-29 PROBLEM — M17.0 PRIMARY OSTEOARTHRITIS OF BOTH KNEES: Status: RESOLVED | Noted: 2020-07-10 | Resolved: 2021-11-29

## 2021-11-29 PROBLEM — F51.01 PRIMARY INSOMNIA: Status: RESOLVED | Noted: 2020-07-10 | Resolved: 2021-11-29

## 2021-11-29 PROCEDURE — 99396 PREV VISIT EST AGE 40-64: CPT | Performed by: PHYSICIAN ASSISTANT

## 2021-11-29 NOTE — PROGRESS NOTES
Patient Care Team:  Kelley Tang PA-C as PCP - General (Physician Assistant)     Chief complaint: Patient is in today for a physical     Peter Catie Bateman is a 46 y.o. male who presents for his yearly physical exam.     Patient presents for annual physical exam and management of his reflux.  The only medication patient is taking is his omeprazole 20 mg daily which works well to control his reflux.  He denies any other concerns or complaints today.  He is not taking his vitamin D daily but does have the supplement and takes it when he remembers.  He is due for colonoscopy.  He declines flu and COVID-19 vaccine.  He is planning on going to school to be a  and was concerned about his blood pressure.  On repeat his blood pressure is borderline at 134/86.  He is not monitoring at home.  He continues to smoke cigarettes and is aware he needs to quit.       Review of Systems   Constitutional: Negative for chills, diaphoresis, fatigue and fever.   HENT: Negative for congestion, ear pain, hearing loss, postnasal drip, rhinorrhea and sore throat.    Eyes: Negative for blurred vision, pain and visual disturbance.   Respiratory: Negative for cough, shortness of breath and wheezing.    Cardiovascular: Negative for chest pain and leg swelling.   Gastrointestinal: Negative for abdominal pain, blood in stool, constipation, diarrhea, nausea, vomiting and indigestion.   Endocrine: Negative for polyuria.   Genitourinary: Negative for dysuria, flank pain and hematuria.   Musculoskeletal: Negative for arthralgias, gait problem and myalgias.   Skin: Negative for rash and skin lesions.   Neurological: Negative for dizziness, weakness, light-headedness, numbness and headache.   Psychiatric/Behavioral: Negative for self-injury, sleep disturbance, suicidal ideas, depressed mood and stress. The patient is not nervous/anxious.         History  Past Medical History:   Diagnosis Date   • Moderate episode of recurrent  major depressive disorder (HCC) 7/10/2020   • Primary insomnia 7/10/2020   • Primary osteoarthritis of both knees 7/10/2020      History reviewed. No pertinent surgical history.   Allergies   Allergen Reactions   • Trazodone Other (See Comments)     Caused insomnia, blurred vision      Family History   Problem Relation Age of Onset   • Acute myelogenous leukemia Mother    • Diabetes Mother    • Lung cancer Father         smoker   • Kidney disease Maternal Grandmother    • Colon cancer Neg Hx    • Prostate cancer Neg Hx       Social History     Socioeconomic History   • Marital status: Single   Tobacco Use   • Smoking status: Current Every Day Smoker     Packs/day: 1.00     Years: 15.00     Pack years: 15.00     Types: Cigarettes   • Smokeless tobacco: Former User     Quit date: 06/2020   Vaping Use   • Vaping Use: Never used   Substance and Sexual Activity   • Alcohol use: Not Currently     Comment: stopped 3/07   • Drug use: Never   • Sexual activity: Not Currently      Current Outpatient Medications on File Prior to Visit   Medication Sig Dispense Refill   • fluticasone (Flonase) 50 MCG/ACT nasal spray 2 sprays into the nostril(s) as directed by provider Daily. 18.2 mL 5   • omeprazole (priLOSEC) 20 MG capsule Take 1 capsule by mouth Daily. 90 capsule 1   • [DISCONTINUED] cyclobenzaprine (FLEXERIL) 10 MG tablet Take 1 tablet by mouth At Night As Needed for Muscle Spasms. 30 tablet 0     No current facility-administered medications on file prior to visit.       Results for orders placed or performed in visit on 07/10/20   CBC Auto Differential    Specimen: Blood   Result Value Ref Range    WBC 8.05 3.40 - 10.80 10*3/mm3    RBC 5.47 4.14 - 5.80 10*6/mm3    Hemoglobin 17.3 13.0 - 17.7 g/dL    Hematocrit 50.3 37.5 - 51.0 %    MCV 92.0 79.0 - 97.0 fL    MCH 31.6 26.6 - 33.0 pg    MCHC 34.4 31.5 - 35.7 g/dL    RDW 12.7 12.3 - 15.4 %    RDW-SD 43.0 37.0 - 54.0 fl    MPV 9.9 6.0 - 12.0 fL    Platelets 324 140 - 450  10*3/mm3    Neutrophil % 73.9 42.7 - 76.0 %    Lymphocyte % 16.1 (L) 19.6 - 45.3 %    Monocyte % 7.8 5.0 - 12.0 %    Eosinophil % 1.2 0.3 - 6.2 %    Basophil % 0.6 0.0 - 1.5 %    Immature Grans % 0.4 0.0 - 0.5 %    Neutrophils, Absolute 5.94 1.70 - 7.00 10*3/mm3    Lymphocytes, Absolute 1.30 0.70 - 3.10 10*3/mm3    Monocytes, Absolute 0.63 0.10 - 0.90 10*3/mm3    Eosinophils, Absolute 0.10 0.00 - 0.40 10*3/mm3    Basophils, Absolute 0.05 0.00 - 0.20 10*3/mm3    Immature Grans, Absolute 0.03 0.00 - 0.05 10*3/mm3    nRBC 0.0 0.0 - 0.2 /100 WBC   Comprehensive Metabolic Panel    Specimen: Blood   Result Value Ref Range    Glucose 101 (H) 65 - 99 mg/dL    BUN 9 6 - 20 mg/dL    Creatinine 0.97 0.76 - 1.27 mg/dL    Sodium 142 136 - 145 mmol/L    Potassium 4.1 3.5 - 5.2 mmol/L    Chloride 104 98 - 107 mmol/L    CO2 25.6 22.0 - 29.0 mmol/L    Calcium 10.1 8.6 - 10.5 mg/dL    Total Protein 7.8 6.0 - 8.5 g/dL    Albumin 4.70 3.50 - 5.20 g/dL    ALT (SGPT) 19 1 - 41 U/L    AST (SGOT) 16 1 - 40 U/L    Alkaline Phosphatase 104 39 - 117 U/L    Total Bilirubin 0.6 0.0 - 1.2 mg/dL    eGFR Non African Amer 84 >60 mL/min/1.73    Globulin 3.1 gm/dL    A/G Ratio 1.5 g/dL    BUN/Creatinine Ratio 9.3 7.0 - 25.0    Anion Gap 12.4 5.0 - 15.0 mmol/L   TSH Rfx On Abnormal To Free T4    Specimen: Blood   Result Value Ref Range    TSH 1.160 0.270 - 4.200 uIU/mL   Lipid Panel    Specimen: Blood   Result Value Ref Range    Total Cholesterol 150 0 - 200 mg/dL    Triglycerides 140 0 - 150 mg/dL    HDL Cholesterol 27 (L) 40 - 60 mg/dL    LDL Cholesterol  95 0 - 100 mg/dL    VLDL Cholesterol 28 5 - 40 mg/dL    LDL/HDL Ratio 3.52    Hepatitis C Antibody    Specimen: Blood   Result Value Ref Range    Hepatitis C Ab Non-Reactive Non-Reactive   Vitamin D 25 Hydroxy    Specimen: Blood   Result Value Ref Range    25 Hydroxy, Vitamin D 18.7 (L) 30.0 - 100.0 ng/ml       Health Maintenance   Topic Date Due   • COLORECTAL CANCER SCREENING  Never done   •  COVID-19 Vaccine (1) 12/01/2021 (Originally 7/16/1980)   • INFLUENZA VACCINE  11/29/2022 (Originally 8/1/2021)   • ANNUAL PHYSICAL  11/30/2022   • TDAP/TD VACCINES (2 - Td or Tdap) 08/03/2030   • Pneumococcal Vaccine 0-64 (2 of 2 - PPSV23) 07/16/2041   • HEPATITIS C SCREENING  Completed       Immunization History   Administered Date(s) Administered   • Pneumococcal Polysaccharide (PPSV23) 08/03/2020   • Tdap 08/03/2020       Patient's Body mass index is 29.53 kg/m². indicating that he is overweight (BMI 25-29.9). Obesity-related health conditions include the following: GERD. Obesity is unchanged. BMI is is above average; BMI management plan is completed. We discussed portion control and increasing exercise..      Results for orders placed or performed in visit on 07/10/20   CBC Auto Differential    Specimen: Blood   Result Value Ref Range    WBC 8.05 3.40 - 10.80 10*3/mm3    RBC 5.47 4.14 - 5.80 10*6/mm3    Hemoglobin 17.3 13.0 - 17.7 g/dL    Hematocrit 50.3 37.5 - 51.0 %    MCV 92.0 79.0 - 97.0 fL    MCH 31.6 26.6 - 33.0 pg    MCHC 34.4 31.5 - 35.7 g/dL    RDW 12.7 12.3 - 15.4 %    RDW-SD 43.0 37.0 - 54.0 fl    MPV 9.9 6.0 - 12.0 fL    Platelets 324 140 - 450 10*3/mm3    Neutrophil % 73.9 42.7 - 76.0 %    Lymphocyte % 16.1 (L) 19.6 - 45.3 %    Monocyte % 7.8 5.0 - 12.0 %    Eosinophil % 1.2 0.3 - 6.2 %    Basophil % 0.6 0.0 - 1.5 %    Immature Grans % 0.4 0.0 - 0.5 %    Neutrophils, Absolute 5.94 1.70 - 7.00 10*3/mm3    Lymphocytes, Absolute 1.30 0.70 - 3.10 10*3/mm3    Monocytes, Absolute 0.63 0.10 - 0.90 10*3/mm3    Eosinophils, Absolute 0.10 0.00 - 0.40 10*3/mm3    Basophils, Absolute 0.05 0.00 - 0.20 10*3/mm3    Immature Grans, Absolute 0.03 0.00 - 0.05 10*3/mm3    nRBC 0.0 0.0 - 0.2 /100 WBC   Comprehensive Metabolic Panel    Specimen: Blood   Result Value Ref Range    Glucose 101 (H) 65 - 99 mg/dL    BUN 9 6 - 20 mg/dL    Creatinine 0.97 0.76 - 1.27 mg/dL    Sodium 142 136 - 145 mmol/L    Potassium 4.1 3.5 -  "5.2 mmol/L    Chloride 104 98 - 107 mmol/L    CO2 25.6 22.0 - 29.0 mmol/L    Calcium 10.1 8.6 - 10.5 mg/dL    Total Protein 7.8 6.0 - 8.5 g/dL    Albumin 4.70 3.50 - 5.20 g/dL    ALT (SGPT) 19 1 - 41 U/L    AST (SGOT) 16 1 - 40 U/L    Alkaline Phosphatase 104 39 - 117 U/L    Total Bilirubin 0.6 0.0 - 1.2 mg/dL    eGFR Non African Amer 84 >60 mL/min/1.73    Globulin 3.1 gm/dL    A/G Ratio 1.5 g/dL    BUN/Creatinine Ratio 9.3 7.0 - 25.0    Anion Gap 12.4 5.0 - 15.0 mmol/L   TSH Rfx On Abnormal To Free T4    Specimen: Blood   Result Value Ref Range    TSH 1.160 0.270 - 4.200 uIU/mL   Lipid Panel    Specimen: Blood   Result Value Ref Range    Total Cholesterol 150 0 - 200 mg/dL    Triglycerides 140 0 - 150 mg/dL    HDL Cholesterol 27 (L) 40 - 60 mg/dL    LDL Cholesterol  95 0 - 100 mg/dL    VLDL Cholesterol 28 5 - 40 mg/dL    LDL/HDL Ratio 3.52    Hepatitis C Antibody    Specimen: Blood   Result Value Ref Range    Hepatitis C Ab Non-Reactive Non-Reactive   Vitamin D 25 Hydroxy    Specimen: Blood   Result Value Ref Range    25 Hydroxy, Vitamin D 18.7 (L) 30.0 - 100.0 ng/ml            Vitals:    11/29/21 1353   BP: 134/78   Pulse: 90   Temp: 97.7 °F (36.5 °C)   SpO2: 97%   Weight: 93.4 kg (205 lb 12.8 oz)   Height: 177.8 cm (70\")   PainSc: 0-No pain       Body mass index is 29.53 kg/m².    Physical Exam  Vitals reviewed.   Constitutional:       General: He is not in acute distress.     Appearance: Normal appearance. He is well-developed and normal weight. He is not ill-appearing or diaphoretic.   HENT:      Head: Normocephalic and atraumatic.      Right Ear: Hearing, tympanic membrane, ear canal and external ear normal.      Left Ear: Hearing, tympanic membrane, ear canal and external ear normal.      Nose: Nose normal.      Right Sinus: No maxillary sinus tenderness or frontal sinus tenderness.      Left Sinus: No maxillary sinus tenderness or frontal sinus tenderness.      Mouth/Throat:      Pharynx: Uvula midline. "   Eyes:      General: Lids are normal.      Extraocular Movements: Extraocular movements intact.      Conjunctiva/sclera: Conjunctivae normal.   Neck:      Thyroid: No thyroid mass or thyromegaly.      Trachea: Trachea and phonation normal.   Cardiovascular:      Rate and Rhythm: Normal rate and regular rhythm.      Heart sounds: Normal heart sounds.   Pulmonary:      Effort: Pulmonary effort is normal.      Breath sounds: Normal breath sounds.   Abdominal:      General: Bowel sounds are normal. There is no distension.      Palpations: Abdomen is soft. Abdomen is not rigid.      Tenderness: There is no abdominal tenderness. There is no guarding.   Musculoskeletal:         General: Normal range of motion.      Cervical back: Normal range of motion.      Right lower leg: No edema.      Left lower leg: No edema.   Lymphadenopathy:      Cervical: No cervical adenopathy.      Right cervical: No superficial cervical adenopathy.     Left cervical: No superficial cervical adenopathy.   Skin:     General: Skin is warm.      Findings: No erythema or rash.      Nails: There is no clubbing.   Neurological:      Mental Status: He is alert and oriented to person, place, and time.      Coordination: Coordination normal.      Gait: Gait normal.      Deep Tendon Reflexes: Reflexes are normal and symmetric.      Comments: CN grossly intact   Psychiatric:         Attention and Perception: Attention and perception normal. He is attentive.         Mood and Affect: Mood and affect normal.         Speech: Speech normal.         Behavior: Behavior normal. Behavior is cooperative.         Thought Content: Thought content normal.         Cognition and Memory: Cognition and memory normal.         Judgment: Judgment normal.             Counseling provided on diet and nutrition, exercise, supplements, flu prevention and vaccinations.    Diagnoses and all orders for this visit:    1. Physical exam, annual (Primary)  PE is  unremarkable  Preventative labs ordered  Colonoscopy - referral placed  Declines flu and Covid-19 vaccinations today.    2. Gastroesophageal reflux disease, unspecified whether esophagitis present  On omeprazole 20 mg daily.  Stable with medication.    3. Vitamin D deficiency  Encouraged patient to take a daily supplement.    4. Screening for thyroid disorder  -     TSH Rfx On Abnormal To Free T4; Future    5. Screening for deficiency anemia  -     CBC Auto Differential; Future    6. Screening for cholesterol level  -     Lipid Panel; Future    7. Screening for diabetes mellitus  -     Comprehensive Metabolic Panel; Future    8. Screen for colon cancer  -     Ambulatory Referral For Screening Colonoscopy    9. Tobacco abuse  Still smoking cigarettes.  Aware he needs to quit.     Kelley Tang PA-C   11/29/2021   14:31 EST

## 2021-12-17 ENCOUNTER — LAB (OUTPATIENT)
Dept: LAB | Facility: HOSPITAL | Age: 46
End: 2021-12-17

## 2021-12-17 DIAGNOSIS — Z13.0 SCREENING FOR DEFICIENCY ANEMIA: ICD-10-CM

## 2021-12-17 DIAGNOSIS — Z13.29 SCREENING FOR THYROID DISORDER: ICD-10-CM

## 2021-12-17 DIAGNOSIS — Z13.220 SCREENING FOR CHOLESTEROL LEVEL: ICD-10-CM

## 2021-12-17 DIAGNOSIS — Z13.1 SCREENING FOR DIABETES MELLITUS: ICD-10-CM

## 2021-12-17 LAB
ALBUMIN SERPL-MCNC: 4.1 G/DL (ref 3.5–5.2)
ALBUMIN/GLOB SERPL: 1.5 G/DL
ALP SERPL-CCNC: 105 U/L (ref 39–117)
ALT SERPL W P-5'-P-CCNC: 17 U/L (ref 1–41)
ANION GAP SERPL CALCULATED.3IONS-SCNC: 13.2 MMOL/L (ref 5–15)
AST SERPL-CCNC: 18 U/L (ref 1–40)
BASOPHILS # BLD AUTO: 0.08 10*3/MM3 (ref 0–0.2)
BASOPHILS NFR BLD AUTO: 0.7 % (ref 0–1.5)
BILIRUB SERPL-MCNC: <0.2 MG/DL (ref 0–1.2)
BUN SERPL-MCNC: 12 MG/DL (ref 6–20)
BUN/CREAT SERPL: 15.4 (ref 7–25)
CALCIUM SPEC-SCNC: 9.5 MG/DL (ref 8.6–10.5)
CHLORIDE SERPL-SCNC: 105 MMOL/L (ref 98–107)
CHOLEST SERPL-MCNC: 127 MG/DL (ref 0–200)
CO2 SERPL-SCNC: 24.8 MMOL/L (ref 22–29)
CREAT SERPL-MCNC: 0.78 MG/DL (ref 0.76–1.27)
DEPRECATED RDW RBC AUTO: 42.8 FL (ref 37–54)
EOSINOPHIL # BLD AUTO: 0.22 10*3/MM3 (ref 0–0.4)
EOSINOPHIL NFR BLD AUTO: 2 % (ref 0.3–6.2)
ERYTHROCYTE [DISTWIDTH] IN BLOOD BY AUTOMATED COUNT: 12.4 % (ref 12.3–15.4)
GFR SERPL CREATININE-BSD FRML MDRD: 108 ML/MIN/1.73
GLOBULIN UR ELPH-MCNC: 2.7 GM/DL
GLUCOSE SERPL-MCNC: 84 MG/DL (ref 65–99)
HCT VFR BLD AUTO: 47.4 % (ref 37.5–51)
HDLC SERPL-MCNC: 21 MG/DL (ref 40–60)
HGB BLD-MCNC: 16.3 G/DL (ref 13–17.7)
IMM GRANULOCYTES # BLD AUTO: 0.02 10*3/MM3 (ref 0–0.05)
IMM GRANULOCYTES NFR BLD AUTO: 0.2 % (ref 0–0.5)
LDLC SERPL CALC-MCNC: 49 MG/DL (ref 0–100)
LDLC/HDLC SERPL: 1.42 {RATIO}
LYMPHOCYTES # BLD AUTO: 1.55 10*3/MM3 (ref 0.7–3.1)
LYMPHOCYTES NFR BLD AUTO: 14 % (ref 19.6–45.3)
MCH RBC QN AUTO: 32.1 PG (ref 26.6–33)
MCHC RBC AUTO-ENTMCNC: 34.4 G/DL (ref 31.5–35.7)
MCV RBC AUTO: 93.3 FL (ref 79–97)
MONOCYTES # BLD AUTO: 0.89 10*3/MM3 (ref 0.1–0.9)
MONOCYTES NFR BLD AUTO: 8 % (ref 5–12)
NEUTROPHILS NFR BLD AUTO: 75.1 % (ref 42.7–76)
NEUTROPHILS NFR BLD AUTO: 8.32 10*3/MM3 (ref 1.7–7)
NRBC BLD AUTO-RTO: 0 /100 WBC (ref 0–0.2)
PLATELET # BLD AUTO: 345 10*3/MM3 (ref 140–450)
PMV BLD AUTO: 9.6 FL (ref 6–12)
POTASSIUM SERPL-SCNC: 4 MMOL/L (ref 3.5–5.2)
PROT SERPL-MCNC: 6.8 G/DL (ref 6–8.5)
RBC # BLD AUTO: 5.08 10*6/MM3 (ref 4.14–5.8)
SODIUM SERPL-SCNC: 143 MMOL/L (ref 136–145)
TRIGL SERPL-MCNC: 381 MG/DL (ref 0–150)
TSH SERPL DL<=0.05 MIU/L-ACNC: 2.49 UIU/ML (ref 0.27–4.2)
VLDLC SERPL-MCNC: 57 MG/DL (ref 5–40)
WBC NRBC COR # BLD: 11.08 10*3/MM3 (ref 3.4–10.8)

## 2021-12-17 PROCEDURE — 84443 ASSAY THYROID STIM HORMONE: CPT

## 2021-12-17 PROCEDURE — 85025 COMPLETE CBC W/AUTO DIFF WBC: CPT

## 2021-12-17 PROCEDURE — 80053 COMPREHEN METABOLIC PANEL: CPT

## 2021-12-17 PROCEDURE — 80061 LIPID PANEL: CPT

## 2021-12-19 DIAGNOSIS — E78.9 ABNORMAL CHOLESTEROL TEST: Primary | ICD-10-CM

## 2022-07-01 ENCOUNTER — TELEPHONE (OUTPATIENT)
Dept: FAMILY MEDICINE CLINIC | Facility: CLINIC | Age: 47
End: 2022-07-01

## 2022-07-01 NOTE — TELEPHONE ENCOUNTER
No answer, left message.  is one [facility] the best [facility] within [place] and I feel he will be getting excellent care there.  I don't have any advice or recommendations other than to work with the doctors at .  I am happy to see him once he is discharged from the hospital.
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
## 2022-08-04 ENCOUNTER — TELEPHONE (OUTPATIENT)
Dept: FAMILY MEDICINE CLINIC | Facility: CLINIC | Age: 47
End: 2022-08-04

## 2022-08-08 ENCOUNTER — LAB REQUISITION (OUTPATIENT)
Dept: LAB | Facility: HOSPITAL | Age: 47
End: 2022-08-08

## 2022-08-08 DIAGNOSIS — Z45.1 ENCOUNTER FOR ADJUSTMENT AND MANAGEMENT OF INFUSION PUMP: ICD-10-CM

## 2022-08-08 DIAGNOSIS — J18.9 PNEUMONIA, UNSPECIFIED ORGANISM: ICD-10-CM

## 2022-08-08 LAB
ALBUMIN SERPL-MCNC: 3.5 G/DL (ref 3.5–5.2)
ALBUMIN/GLOB SERPL: 1.4 G/DL
ALP SERPL-CCNC: 176 U/L (ref 39–117)
ALT SERPL W P-5'-P-CCNC: 11 U/L (ref 1–41)
ANION GAP SERPL CALCULATED.3IONS-SCNC: 12 MMOL/L (ref 5–15)
AST SERPL-CCNC: 14 U/L (ref 1–40)
BASOPHILS # BLD AUTO: 0.05 10*3/MM3 (ref 0–0.2)
BASOPHILS NFR BLD AUTO: 0.8 % (ref 0–1.5)
BILIRUB SERPL-MCNC: 0.2 MG/DL (ref 0–1.2)
BUN SERPL-MCNC: 9 MG/DL (ref 6–20)
BUN/CREAT SERPL: 15.3 (ref 7–25)
CALCIUM SPEC-SCNC: 9.2 MG/DL (ref 8.6–10.5)
CHLORIDE SERPL-SCNC: 106 MMOL/L (ref 98–107)
CO2 SERPL-SCNC: 22 MMOL/L (ref 22–29)
CREAT SERPL-MCNC: 0.59 MG/DL (ref 0.76–1.27)
CRP SERPL-MCNC: 2.32 MG/DL (ref 0–0.5)
DEPRECATED RDW RBC AUTO: 46.7 FL (ref 37–54)
EGFRCR SERPLBLD CKD-EPI 2021: 121.2 ML/MIN/1.73
EOSINOPHIL # BLD AUTO: 0.55 10*3/MM3 (ref 0–0.4)
EOSINOPHIL NFR BLD AUTO: 9.2 % (ref 0.3–6.2)
ERYTHROCYTE [DISTWIDTH] IN BLOOD BY AUTOMATED COUNT: 14.4 % (ref 12.3–15.4)
GLOBULIN UR ELPH-MCNC: 2.5 GM/DL
GLUCOSE SERPL-MCNC: 96 MG/DL (ref 65–99)
HCT VFR BLD AUTO: 34.8 % (ref 37.5–51)
HGB BLD-MCNC: 11.7 G/DL (ref 13–17.7)
IMM GRANULOCYTES # BLD AUTO: 0.02 10*3/MM3 (ref 0–0.05)
IMM GRANULOCYTES NFR BLD AUTO: 0.3 % (ref 0–0.5)
LYMPHOCYTES # BLD AUTO: 1.02 10*3/MM3 (ref 0.7–3.1)
LYMPHOCYTES NFR BLD AUTO: 17.1 % (ref 19.6–45.3)
MCH RBC QN AUTO: 29.7 PG (ref 26.6–33)
MCHC RBC AUTO-ENTMCNC: 33.6 G/DL (ref 31.5–35.7)
MCV RBC AUTO: 88.3 FL (ref 79–97)
MONOCYTES # BLD AUTO: 0.59 10*3/MM3 (ref 0.1–0.9)
MONOCYTES NFR BLD AUTO: 9.9 % (ref 5–12)
NEUTROPHILS NFR BLD AUTO: 3.74 10*3/MM3 (ref 1.7–7)
NEUTROPHILS NFR BLD AUTO: 62.7 % (ref 42.7–76)
NRBC BLD AUTO-RTO: 0 /100 WBC (ref 0–0.2)
PLATELET # BLD AUTO: 411 10*3/MM3 (ref 140–450)
PMV BLD AUTO: 9.5 FL (ref 6–12)
POTASSIUM SERPL-SCNC: 4.3 MMOL/L (ref 3.5–5.2)
PROT SERPL-MCNC: 6 G/DL (ref 6–8.5)
RBC # BLD AUTO: 3.94 10*6/MM3 (ref 4.14–5.8)
SODIUM SERPL-SCNC: 140 MMOL/L (ref 136–145)
WBC NRBC COR # BLD: 5.97 10*3/MM3 (ref 3.4–10.8)

## 2022-08-08 PROCEDURE — 80053 COMPREHEN METABOLIC PANEL: CPT | Performed by: NURSE PRACTITIONER

## 2022-08-08 PROCEDURE — 86140 C-REACTIVE PROTEIN: CPT | Performed by: NURSE PRACTITIONER

## 2022-08-08 PROCEDURE — 85025 COMPLETE CBC W/AUTO DIFF WBC: CPT | Performed by: NURSE PRACTITIONER

## 2022-08-09 ENCOUNTER — TELEPHONE (OUTPATIENT)
Dept: FAMILY MEDICINE CLINIC | Facility: CLINIC | Age: 47
End: 2022-08-09

## 2022-08-09 ENCOUNTER — OFFICE VISIT (OUTPATIENT)
Dept: FAMILY MEDICINE CLINIC | Facility: CLINIC | Age: 47
End: 2022-08-09

## 2022-08-09 VITALS
WEIGHT: 205 LBS | HEIGHT: 70 IN | HEART RATE: 90 BPM | SYSTOLIC BLOOD PRESSURE: 140 MMHG | TEMPERATURE: 98.3 F | DIASTOLIC BLOOD PRESSURE: 80 MMHG | BODY MASS INDEX: 29.35 KG/M2 | OXYGEN SATURATION: 96 %

## 2022-08-09 DIAGNOSIS — G89.4 CHRONIC PAIN SYNDROME: ICD-10-CM

## 2022-08-09 DIAGNOSIS — Z09 HOSPITAL DISCHARGE FOLLOW-UP: Primary | ICD-10-CM
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
## 2022-08-09 DIAGNOSIS — Z72.0 TOBACCO ABUSE: ICD-10-CM

## 2022-08-09 PROBLEM — S42.102A FRACTURE OF LEFT SCAPULA: Status: ACTIVE | Noted: 2022-06-22

## 2022-08-09 PROBLEM — S27.321A CONTUSION OF LEFT LUNG: Status: ACTIVE | Noted: 2022-06-22

## 2022-08-09 PROBLEM — S22.42XA FRACTURE OF MULTIPLE RIBS OF LEFT SIDE: Status: ACTIVE | Noted: 2022-06-22

## 2022-08-09 PROBLEM — M21.952: Status: ACTIVE | Noted: 2022-06-22

## 2022-08-09 PROBLEM — S82.202B OPEN FRACTURE OF LEFT TIBIA AND FIBULA: Status: ACTIVE | Noted: 2022-06-22

## 2022-08-09 PROBLEM — B46.5: Status: ACTIVE | Noted: 2022-07-19

## 2022-08-09 PROBLEM — S22.008A CLOSED FRACTURE OF SPINOUS PROCESS OF THORACIC VERTEBRA (HCC): Status: ACTIVE | Noted: 2022-06-22

## 2022-08-09 PROBLEM — S21.102A OPEN CHEST WOUND, LEFT, INITIAL ENCOUNTER: Status: ACTIVE | Noted: 2022-06-22

## 2022-08-09 PROBLEM — S72.92XA FRACTURE OF LEFT FEMUR: Status: ACTIVE | Noted: 2022-06-22

## 2022-08-09 PROBLEM — T14.90XA TRAUMA: Status: ACTIVE | Noted: 2022-06-22

## 2022-08-09 PROBLEM — F32.A DEPRESSION: Status: ACTIVE | Noted: 2022-07-04

## 2022-08-09 PROBLEM — D62 ANEMIA DUE TO ACUTE BLOOD LOSS: Status: ACTIVE | Noted: 2022-06-22

## 2022-08-09 PROBLEM — S36.039A SPLEEN LACERATION: Status: ACTIVE | Noted: 2022-06-22

## 2022-08-09 PROBLEM — F51.5 NIGHTMARES: Status: ACTIVE | Noted: 2022-07-12

## 2022-08-09 PROBLEM — S93.335A: Status: ACTIVE | Noted: 2022-06-22

## 2022-08-09 PROBLEM — S92.002A FRACTURE OF LEFT CALCANEUS: Status: ACTIVE | Noted: 2022-06-22

## 2022-08-09 PROBLEM — E66.9 OBESITY (BMI 30-39.9): Status: ACTIVE | Noted: 2022-06-22

## 2022-08-09 PROBLEM — A49.8: Status: ACTIVE | Noted: 2022-07-13
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
## 2022-08-09 PROBLEM — G89.11 ACUTE PAIN DUE TO TRAUMA: Status: ACTIVE | Noted: 2022-06-29

## 2022-08-09 PROBLEM — S82.402B OPEN FRACTURE OF LEFT TIBIA AND FIBULA: Status: ACTIVE | Noted: 2022-06-22

## 2022-08-09 PROCEDURE — 99213 OFFICE O/P EST LOW 20 MIN: CPT | Performed by: PHYSICIAN ASSISTANT

## 2022-08-09 RX ORDER — MULTIPLE VITAMINS W/ MINERALS TAB 9MG-400MCG
1 TAB ORAL DAILY
COMMUNITY
Start: 2022-08-05 | End: 2022-08-15

## 2022-08-09 RX ORDER — PRAZOSIN HYDROCHLORIDE 2 MG/1
CAPSULE ORAL
COMMUNITY
Start: 2022-08-04 | End: 2022-10-04 | Stop reason: SDUPTHER

## 2022-08-09 RX ORDER — IBUPROFEN 600 MG/1
600 TABLET ORAL EVERY 6 HOURS PRN
COMMUNITY
End: 2022-08-09 | Stop reason: SDUPTHER

## 2022-08-09 RX ORDER — TRAZODONE HYDROCHLORIDE 100 MG/1
TABLET ORAL
COMMUNITY
Start: 2022-08-04 | End: 2022-10-04

## 2022-08-09 RX ORDER — FOLIC ACID/MV,IRON,MIN/LUTEIN 0.4-18-25
TABLET ORAL
COMMUNITY
Start: 2022-08-04 | End: 2022-10-04

## 2022-08-09 RX ORDER — PSEUDOEPHED/ACETAMINOPH/DIPHEN 30MG-500MG
1000 TABLET ORAL EVERY 8 HOURS PRN
Qty: 180 TABLET | Refills: 1 | Status: SHIPPED | OUTPATIENT
Start: 2022-08-09 | End: 2022-12-21 | Stop reason: SDUPTHER

## 2022-08-09 RX ORDER — CEFEPIME HYDROCHLORIDE 1 G/1
INJECTION, POWDER, FOR SOLUTION INTRAMUSCULAR; INTRAVENOUS
COMMUNITY
Start: 2022-08-02 | End: 2022-08-24

## 2022-08-09 RX ORDER — NICOTINE 21 MG/24HR
1 PATCH, TRANSDERMAL 24 HOURS TRANSDERMAL EVERY 24 HOURS
Qty: 28 EACH | Refills: 1 | Status: SHIPPED | OUTPATIENT
Start: 2022-08-09 | End: 2022-08-15

## 2022-08-09 RX ORDER — OMEPRAZOLE 20 MG/1
20 TABLET, DELAYED RELEASE ORAL DAILY
COMMUNITY
End: 2022-08-09

## 2022-08-09 RX ORDER — PSEUDOEPHED/ACETAMINOPH/DIPHEN 30MG-500MG
TABLET ORAL
COMMUNITY
Start: 2022-08-04 | End: 2022-08-09 | Stop reason: SDUPTHER

## 2022-08-09 RX ORDER — CYCLOBENZAPRINE HCL 10 MG
10 TABLET ORAL 3 TIMES DAILY PRN
COMMUNITY
End: 2022-10-04

## 2022-08-09 RX ORDER — LANOLIN ALCOHOL/MO/W.PET/CERES
CREAM (GRAM) TOPICAL
COMMUNITY
Start: 2022-08-04 | End: 2022-08-15 | Stop reason: SDUPTHER

## 2022-08-09 RX ORDER — GABAPENTIN 800 MG/1
800 TABLET ORAL 4 TIMES DAILY
COMMUNITY
End: 2022-10-04

## 2022-08-09 RX ORDER — DOCUSATE SODIUM -SENNOSIDES 50; 8.6 MG/1; MG/1
TABLET, COATED ORAL
COMMUNITY
Start: 2022-08-04 | End: 2022-08-09

## 2022-08-09 RX ORDER — DULOXETIN HYDROCHLORIDE 30 MG/1
CAPSULE, DELAYED RELEASE ORAL
COMMUNITY
Start: 2022-08-04 | End: 2022-10-04 | Stop reason: SDUPTHER

## 2022-08-09 RX ORDER — GABAPENTIN 800 MG/1
TABLET ORAL
COMMUNITY
Start: 2022-08-04 | End: 2022-08-09

## 2022-08-09 RX ORDER — MAGNESIUM HYDROXIDE 1200 MG/15ML
SUSPENSION ORAL
COMMUNITY
Start: 2022-08-04 | End: 2022-08-09

## 2022-08-09 RX ORDER — LIDOCAINE 5 %
ADHESIVE PATCH, MEDICATED TOPICAL
COMMUNITY
Start: 2022-08-04 | End: 2022-08-09

## 2022-08-09 RX ORDER — IBUPROFEN 400 MG/1
TABLET ORAL
COMMUNITY
Start: 2022-08-04 | End: 2022-08-09

## 2022-08-09 RX ORDER — CYCLOBENZAPRINE HCL 10 MG
TABLET ORAL
COMMUNITY
Start: 2022-08-04 | End: 2022-08-09

## 2022-08-09 RX ORDER — HYDROXYZINE PAMOATE 50 MG/1
CAPSULE ORAL
COMMUNITY
Start: 2022-08-04 | End: 2022-08-15 | Stop reason: SDUPTHER

## 2022-08-09 RX ORDER — IBUPROFEN 600 MG/1
600 TABLET ORAL EVERY 6 HOURS PRN
Qty: 120 TABLET | Refills: 1 | Status: SHIPPED | OUTPATIENT
Start: 2022-08-09 | End: 2022-12-21 | Stop reason: SDUPTHER
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
## 2022-08-15 ENCOUNTER — TELEPHONE (OUTPATIENT)
Dept: FAMILY MEDICINE CLINIC | Facility: CLINIC | Age: 47
End: 2022-08-15

## 2022-08-15 DIAGNOSIS — F41.9 ANXIETY: Primary | ICD-10-CM

## 2022-08-15 DIAGNOSIS — F51.01 PRIMARY INSOMNIA: ICD-10-CM

## 2022-08-15 DIAGNOSIS — K21.9 GASTROESOPHAGEAL REFLUX DISEASE, UNSPECIFIED WHETHER ESOPHAGITIS PRESENT: ICD-10-CM

## 2022-08-15 RX ORDER — OXYCODONE HYDROCHLORIDE 5 MG/1
TABLET ORAL
COMMUNITY
Start: 2022-08-10 | End: 2022-08-15 | Stop reason: SDUPTHER

## 2022-08-15 NOTE — TELEPHONE ENCOUNTER
Rx Refill Note  Requested Prescriptions     Pending Prescriptions Disp Refills   • melatonin 3 MG tablet     • hydrOXYzine pamoate (VISTARIL) 50 MG capsule        Last office visit with prescribing clinician: 8/9/2022      Next office visit with prescribing clinician: 8/15/2022   Chayito Guevara MA  08/15/22, 14:56 EDT     Historical provider

## 2022-08-15 NOTE — TELEPHONE ENCOUNTER
Caller: Brock Bateman    Relationship: Self    Best call back number: [phone]    Requested Prescriptions:   Requested Prescriptions     Pending Prescriptions Disp Refills   • melatonin 3 MG tablet     • hydrOXYzine pamoate (VISTARIL) 50 MG capsule          Pharmacy where request should be sent: [facility] - [phone]  - [phone] FX     Additional details provided by patient: PLEASE REFILL    Does the patient have less than a 3 day supply:  [x] Yes  [] No    Tian Terrazas Rep   08/15/22 11:26 EDT

## 2022-08-15 NOTE — TELEPHONE ENCOUNTER
Caller: Brock Bateman    Relationship: Self    Best call back number: [phone]    What was the call regarding: WAS TOLD TO CALL TO GIVE FULL LIST OF MEDICATION LIST: LIST AS FOLLOWS:  OXYCODONE HCL 5 MG  Acetaminophen Extra Strength 500 MG tablet  certavite/antioxidants tablet tablet    cyclobenzaprine (FLEXERIL) 10 MG tablet 10 mg, 3 Times Daily PRN      DULoxetine (CYMBALTA) 30 MG capsule        gabapentin (NEURONTIN) 800 MG tablet 800 mg, 4 Times Daily      hydrOXYzine pamoate (VISTARIL) 50 MG capsule        ibuprofen (ADVIL,MOTRIN) 400 MG tablet  400 mg, Every 6 Hours PRN   melatonin 3 MG tablet        omeprazole (priLOSEC) 20 MG capsule 20 mg, Daily      prazosin (MINIPRESS) 2 MG capsule        traZODone (DESYREL) 100 MG tablet    Do you require a callback: NO, ONLY IF NEEDED

## 2022-08-15 NOTE — TELEPHONE ENCOUNTER
Please contact patient and find out frequency that he is using the Vistaril (hydroxyzine) 50 mg.    I assume he is only using melatonin 3 mg at night.  This may not be covered by insurance as it is usually an OTC medication.

## 2022-08-16 ENCOUNTER — LAB REQUISITION (OUTPATIENT)
Dept: LAB | Facility: HOSPITAL | Age: 47
End: 2022-08-16

## 2022-08-16 DIAGNOSIS — J18.9 PNEUMONIA, UNSPECIFIED ORGANISM: ICD-10-CM

## 2022-08-16 DIAGNOSIS — B96.5 PSEUDOMONAS (AERUGINOSA) (MALLEI) (PSEUDOMALLEI) AS THE CAUSE OF DISEASES CLASSIFIED ELSEWHERE: ICD-10-CM

## 2022-08-16 LAB
ALBUMIN SERPL-MCNC: 3.7 G/DL (ref 3.5–5.2)
ALBUMIN SERPL-MCNC: 3.7 G/DL (ref 3.5–5.2)
ALBUMIN/GLOB SERPL: 1.7 G/DL
ALP SERPL-CCNC: 164 U/L (ref 39–117)
ALT SERPL W P-5'-P-CCNC: 12 U/L (ref 1–41)
ANION GAP SERPL CALCULATED.3IONS-SCNC: 13 MMOL/L (ref 5–15)
ANION GAP SERPL CALCULATED.3IONS-SCNC: 13 MMOL/L (ref 5–15)
AST SERPL-CCNC: 16 U/L (ref 1–40)
BASOPHILS # BLD AUTO: 0.03 10*3/MM3 (ref 0–0.2)
BASOPHILS NFR BLD AUTO: 0.4 % (ref 0–1.5)
BILIRUB SERPL-MCNC: 0.2 MG/DL (ref 0–1.2)
BUN SERPL-MCNC: 9 MG/DL (ref 6–20)
BUN SERPL-MCNC: 9 MG/DL (ref 6–20)
BUN/CREAT SERPL: 15 (ref 7–25)
BUN/CREAT SERPL: 15 (ref 7–25)
CALCIUM SPEC-SCNC: 9.4 MG/DL (ref 8.6–10.5)
CALCIUM SPEC-SCNC: 9.4 MG/DL (ref 8.6–10.5)
CHLORIDE SERPL-SCNC: 107 MMOL/L (ref 98–107)
CHLORIDE SERPL-SCNC: 107 MMOL/L (ref 98–107)
CO2 SERPL-SCNC: 24 MMOL/L (ref 22–29)
CO2 SERPL-SCNC: 24 MMOL/L (ref 22–29)
CREAT SERPL-MCNC: 0.6 MG/DL (ref 0.76–1.27)
CREAT SERPL-MCNC: 0.6 MG/DL (ref 0.76–1.27)
CRP SERPL-MCNC: 1.6 MG/DL (ref 0–0.5)
DEPRECATED RDW RBC AUTO: 45.6 FL (ref 37–54)
EGFRCR SERPLBLD CKD-EPI 2021: 120.6 ML/MIN/1.73
EGFRCR SERPLBLD CKD-EPI 2021: 120.6 ML/MIN/1.73
EOSINOPHIL # BLD AUTO: 0.29 10*3/MM3 (ref 0–0.4)
EOSINOPHIL NFR BLD AUTO: 4.3 % (ref 0.3–6.2)
ERYTHROCYTE [DISTWIDTH] IN BLOOD BY AUTOMATED COUNT: 14.1 % (ref 12.3–15.4)
GLOBULIN UR ELPH-MCNC: 2.2 GM/DL
GLUCOSE SERPL-MCNC: 87 MG/DL (ref 65–99)
GLUCOSE SERPL-MCNC: 87 MG/DL (ref 65–99)
HCT VFR BLD AUTO: 36 % (ref 37.5–51)
HGB BLD-MCNC: 11.7 G/DL (ref 13–17.7)
IMM GRANULOCYTES # BLD AUTO: 0.03 10*3/MM3 (ref 0–0.05)
IMM GRANULOCYTES NFR BLD AUTO: 0.4 % (ref 0–0.5)
LYMPHOCYTES # BLD AUTO: 1 10*3/MM3 (ref 0.7–3.1)
LYMPHOCYTES NFR BLD AUTO: 14.9 % (ref 19.6–45.3)
MCH RBC QN AUTO: 28.7 PG (ref 26.6–33)
MCHC RBC AUTO-ENTMCNC: 32.5 G/DL (ref 31.5–35.7)
MCV RBC AUTO: 88.5 FL (ref 79–97)
MONOCYTES # BLD AUTO: 0.53 10*3/MM3 (ref 0.1–0.9)
MONOCYTES NFR BLD AUTO: 7.9 % (ref 5–12)
NEUTROPHILS NFR BLD AUTO: 4.84 10*3/MM3 (ref 1.7–7)
NEUTROPHILS NFR BLD AUTO: 72.1 % (ref 42.7–76)
NRBC BLD AUTO-RTO: 0 /100 WBC (ref 0–0.2)
PHOSPHATE SERPL-MCNC: 4.8 MG/DL (ref 2.5–4.5)
PLATELET # BLD AUTO: 347 10*3/MM3 (ref 140–450)
PMV BLD AUTO: 8.9 FL (ref 6–12)
POTASSIUM SERPL-SCNC: 4.2 MMOL/L (ref 3.5–5.2)
POTASSIUM SERPL-SCNC: 4.2 MMOL/L (ref 3.5–5.2)
PROT SERPL-MCNC: 5.9 G/DL (ref 6–8.5)
RBC # BLD AUTO: 4.07 10*6/MM3 (ref 4.14–5.8)
SODIUM SERPL-SCNC: 144 MMOL/L (ref 136–145)
SODIUM SERPL-SCNC: 144 MMOL/L (ref 136–145)
WBC NRBC COR # BLD: 6.72 10*3/MM3 (ref 3.4–10.8)

## 2022-08-16 PROCEDURE — 85025 COMPLETE CBC W/AUTO DIFF WBC: CPT | Performed by: NURSE PRACTITIONER

## 2022-08-16 PROCEDURE — 84100 ASSAY OF PHOSPHORUS: CPT | Performed by: NURSE PRACTITIONER

## 2022-08-16 PROCEDURE — 86140 C-REACTIVE PROTEIN: CPT | Performed by: NURSE PRACTITIONER

## 2022-08-16 PROCEDURE — 80053 COMPREHEN METABOLIC PANEL: CPT | Performed by: NURSE PRACTITIONER

## 2022-08-16 RX ORDER — HYDROXYZINE PAMOATE 50 MG/1
50 CAPSULE ORAL EVERY 6 HOURS PRN
Qty: 120 CAPSULE | Refills: 2 | Status: SHIPPED | OUTPATIENT
Start: 2022-08-16 | End: 2022-10-04

## 2022-08-16 RX ORDER — LANOLIN ALCOHOL/MO/W.PET/CERES
3 CREAM (GRAM) TOPICAL NIGHTLY PRN
Qty: 30 TABLET | Refills: 2 | Status: SHIPPED | OUTPATIENT
Start: 2022-08-16 | End: 2022-10-04

## 2022-10-04 ENCOUNTER — OFFICE VISIT (OUTPATIENT)
Dept: FAMILY MEDICINE CLINIC | Facility: CLINIC | Age: 47
End: 2022-10-04

## 2022-10-04 VITALS
DIASTOLIC BLOOD PRESSURE: 80 MMHG | TEMPERATURE: 98.4 F | BODY MASS INDEX: 29.35 KG/M2 | SYSTOLIC BLOOD PRESSURE: 124 MMHG | WEIGHT: 205 LBS | HEIGHT: 70 IN | OXYGEN SATURATION: 98 % | HEART RATE: 88 BPM

## 2022-10-04 DIAGNOSIS — G89.4 CHRONIC PAIN SYNDROME: ICD-10-CM

## 2022-10-04 DIAGNOSIS — F32.9 REACTIVE DEPRESSION: ICD-10-CM

## 2022-10-04 DIAGNOSIS — F41.9 ANXIETY: ICD-10-CM
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
## 2022-10-04 DIAGNOSIS — M79.605 LEFT LEG PAIN: ICD-10-CM

## 2022-10-04 DIAGNOSIS — F51.5 NIGHTMARES: ICD-10-CM

## 2022-10-04 DIAGNOSIS — F51.01 PRIMARY INSOMNIA: ICD-10-CM

## 2022-10-04 DIAGNOSIS — F43.10 PTSD (POST-TRAUMATIC STRESS DISORDER): ICD-10-CM

## 2022-10-04 PROCEDURE — 99214 OFFICE O/P EST MOD 30 MIN: CPT | Performed by: PHYSICIAN ASSISTANT

## 2022-10-04 RX ORDER — PRAZOSIN HYDROCHLORIDE 1 MG/1
1 CAPSULE ORAL NIGHTLY
Qty: 30 CAPSULE | Refills: 1 | Status: SHIPPED | OUTPATIENT
Start: 2022-10-04 | End: 2022-11-01

## 2022-10-04 RX ORDER — MIRTAZAPINE 30 MG/1
30 TABLET, FILM COATED ORAL NIGHTLY
Qty: 30 TABLET | Refills: 1 | Status: SHIPPED | OUTPATIENT
Start: 2022-10-04 | End: 2022-11-01

## 2022-10-04 RX ORDER — DULOXETIN HYDROCHLORIDE 30 MG/1
30 CAPSULE, DELAYED RELEASE ORAL DAILY
Qty: 30 CAPSULE | Refills: 2 | Status: SHIPPED | OUTPATIENT
Start: 2022-10-04 | End: 2022-10-20 | Stop reason: SDUPTHER

## 2022-10-04 RX ORDER — GABAPENTIN 800 MG/1
800 TABLET ORAL 3 TIMES DAILY
COMMUNITY
Start: 2022-09-21 | End: 2022-10-04

## 2022-10-04 RX ORDER — METHOCARBAMOL 500 MG/1
TABLET, FILM COATED ORAL
COMMUNITY
Start: 2022-09-21 | End: 2022-10-04
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
## 2022-10-06 ENCOUNTER — TELEPHONE (OUTPATIENT)
Dept: FAMILY MEDICINE CLINIC | Facility: CLINIC | Age: 47
End: 2022-10-06

## 2022-10-06 NOTE — TELEPHONE ENCOUNTER
Pt called the office stating he went to ortho yesterday and is in a lot of pain and can't get any sleep.  Pt states he was told by PCP to follow up with ortho to get a pain magmt referral. Pt states he went to ortho and they were not able to refer him. I advised pt to go to the ER if he is having unbearable pain, pt declined. I offered to schedule pt today with PCP being out, pt declined. I offered to schedule an appt with PCP tomorrow, pt declined & states he does not know when he'll be able to come in so he will check his schedule and call back later to schedule.

## 2022-10-19 ENCOUNTER — TELEPHONE (OUTPATIENT)
Dept: FAMILY MEDICINE CLINIC | Facility: CLINIC | Age: 47
End: 2022-10-19
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
## 2022-10-19 DIAGNOSIS — G89.4 CHRONIC PAIN SYNDROME: ICD-10-CM

## 2022-10-19 DIAGNOSIS — M79.605 LEFT LEG PAIN: ICD-10-CM

## 2022-10-19 NOTE — TELEPHONE ENCOUNTER
Mr. Bateman called [facility] in regards to a referral that was made. He stated that he had gotten a call to set up an appointment, but when he called back he was told that they didn't have a referral for him. When I suggested that I let him rossy to the referral coordinator, that she would be able to help, he refused and asked to speak with his doctor.    Please advise  Brock Bateman  [phone]

## 2022-10-19 NOTE — TELEPHONE ENCOUNTER
Patient had a bone graft procedure yesterday at  Ortho he reports that he is in excruciating pain and is having a mental breakdown. I spoke with him on the phone he denied any suicidal ideation but he is very concerned about his mental health.     He was struggling to make an appointment with [facility]- I contacted the office to inquire further. [facility] has received his referral but soonest appointment is 11/22/2022 @ 1pm     Patient notified, he is interested in pain management and requesting a referral

## 2022-10-19 NOTE — TELEPHONE ENCOUNTER
Patient was supposed to speak with his surgeon about a referral to pain management.  I will place a referral but can not promise that pain management will see him given that he is currently under the care of his surgeon at .  I recommend he contact his surgeon about his current pain.    If he is concerned about his mental health, I recommend he go to [facility]  or [facility] psychiat[facility].  We recently started him on medications at last appointment - is he taking these?

## 2022-10-20 DIAGNOSIS — G89.4 CHRONIC PAIN SYNDROME: ICD-10-CM

## 2022-10-20 DIAGNOSIS — F32.9 REACTIVE DEPRESSION: ICD-10-CM

## 2022-10-20 DIAGNOSIS — F41.9 ANXIETY: ICD-10-CM

## 2022-10-20 RX ORDER — DULOXETIN HYDROCHLORIDE 60 MG/1
60 CAPSULE, DELAYED RELEASE ORAL DAILY
Qty: 30 CAPSULE | Refills: 1 | Status: SHIPPED | OUTPATIENT
Start: 2022-10-20 | End: 2022-11-01

## 2022-10-20 RX ORDER — HYDROXYZINE HYDROCHLORIDE 25 MG/1
25 TABLET, FILM COATED ORAL 3 TIMES DAILY PRN
Qty: 90 TABLET | Refills: 1 | Status: SHIPPED | OUTPATIENT
Start: 2022-10-20 | End: 2022-12-21

## 2022-10-20 NOTE — TELEPHONE ENCOUNTER
A referral for pain management has been placed.  They should contact patient within 10 days to schedule an appointment.    I will increase his Cymbalta from 30 mg daily to 60 mg daily.  I have sent in a new prescription.  I have also sent in hydroxyzine 25 mg as needed for anxiety.  Hydroxyzine may be sedating.    Please schedule an appointment if his symptoms worsen or do not improve.

## 2022-10-20 NOTE — TELEPHONE ENCOUNTER
Attempted to contact, no answer left message asking for return call      A referral for pain management has been placed.  They should contact patient within 10 days to schedule an appointment.     I will increase his Cymbalta from 30 mg daily to 60 mg daily.  I have sent in a new prescription.  I have also sent in hydroxyzine 25 mg as needed for anxiety.  Hydroxyzine may be sedating.     Please schedule an appointment if his symptoms worsen or do not improve  Hub can relay and document.

## 2022-10-20 NOTE — TELEPHONE ENCOUNTER
Attempted to contact, no answer. Left detailed voicemail relaying provider's message        A referral for pain management has been placed.  They should contact patient within 10 days to schedule an appointment.     I will increase his Cymbalta from 30 mg daily to 60 mg daily.  I have sent in a new prescription.  I have also sent in hydroxyzine 25 mg as needed for anxiety.  Hydroxyzine may be sedating.     Please schedule an appointment if his symptoms worsen or do not improve

## 2022-10-20 NOTE — TELEPHONE ENCOUNTER
Patient reports that his nightmares and anxiety is not well controlled with his current med. His severe pain is causing his worsening anxiety    He did contact [facility] and they referred him back to PCP for pain management referral

## 2022-11-01 ENCOUNTER — OFFICE VISIT (OUTPATIENT)
Dept: FAMILY MEDICINE CLINIC | Facility: CLINIC | Age: 47
End: 2022-11-01

## 2022-11-01 ENCOUNTER — TELEPHONE (OUTPATIENT)
Dept: FAMILY MEDICINE CLINIC | Facility: CLINIC | Age: 47
End: 2022-11-01

## 2022-11-01 VITALS
SYSTOLIC BLOOD PRESSURE: 138 MMHG | HEART RATE: 82 BPM | BODY MASS INDEX: 29.35 KG/M2 | WEIGHT: 205 LBS | HEIGHT: 70 IN | DIASTOLIC BLOOD PRESSURE: 78 MMHG | TEMPERATURE: 97.8 F | OXYGEN SATURATION: 98 %

## 2022-11-01 DIAGNOSIS — F51.01 PRIMARY INSOMNIA: ICD-10-CM

## 2022-11-01 DIAGNOSIS — F41.9 ANXIETY: Primary | ICD-10-CM

## 2022-11-01 DIAGNOSIS — G89.4 CHRONIC PAIN SYNDROME: ICD-10-CM
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
## 2022-11-01 PROCEDURE — 99214 OFFICE O/P EST MOD 30 MIN: CPT | Performed by: PHYSICIAN ASSISTANT

## 2022-11-01 RX ORDER — BUSPIRONE HYDROCHLORIDE 5 MG/1
5 TABLET ORAL 3 TIMES DAILY
Qty: 90 TABLET | Refills: 2 | Status: SHIPPED | OUTPATIENT
Start: 2022-11-01 | End: 2022-12-21

## 2022-11-01 RX ORDER — GABAPENTIN 800 MG/1
TABLET ORAL
COMMUNITY
Start: 2022-10-10 | End: 2022-12-21

## 2022-11-01 RX ORDER — ACETAMINOPHEN 500 MG
1000 TABLET ORAL EVERY 6 HOURS PRN
COMMUNITY
Start: 2022-10-18 | End: 2022-12-21

## 2022-11-01 RX ORDER — METHOCARBAMOL 750 MG/1
750 TABLET, FILM COATED ORAL 4 TIMES DAILY
COMMUNITY
Start: 2022-10-18 | End: 2022-12-21

## 2022-11-01 RX ORDER — IBUPROFEN 400 MG/1
400 TABLET ORAL EVERY 6 HOURS PRN
COMMUNITY
Start: 2022-10-18 | End: 2022-12-21
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
## 2022-11-01 NOTE — TELEPHONE ENCOUNTER
Caller: Brock Bateman    Relationship to patient: Self    Best call back number: [phone]    What is the call regarding:  PATIENT IS CALLING TO SEE IF HE COULD GET A HANDICAPPED PLACARD.  ALSO, HE SURGERY TWO WEEKS AGO AND WANTED TO SEE IF HE COULD GET THE STITCHES TAKEN OUT AT THE OFFICE. PLEASE ADVISE.

## 2022-11-01 NOTE — TELEPHONE ENCOUNTER
Contacted patient to discuss further. I advised him that we can provide parking placard form.He requested this to be mailed to him    I advised him that he should return to the surgeons office for stitch removal. He is unable to make it to the appointment with the surgeon today due to lack of transportation. He is unable to reschedule with surgeons office until mid December. He states he will remove the stitches himself.     I offered resources for transportation and he declined.

## 2022-11-01 NOTE — TELEPHONE ENCOUNTER
We can give him a handicap placard.  Paperwork given to [employer].    We are not able to remove his stiches - that will have to be completed by the surgeon's office.

## 2022-12-19 DIAGNOSIS — K21.9 GASTROESOPHAGEAL REFLUX DISEASE, UNSPECIFIED WHETHER ESOPHAGITIS PRESENT: ICD-10-CM

## 2022-12-19 RX ORDER — OMEPRAZOLE 20 MG/1
CAPSULE, DELAYED RELEASE ORAL
Qty: 90 CAPSULE | Refills: 1 | Status: SHIPPED | OUTPATIENT
Start: 2022-12-19

## 2022-12-19 NOTE — TELEPHONE ENCOUNTER
Rx Refill Note  Requested Prescriptions     Pending Prescriptions Disp Refills   • omeprazole (priLOSEC) 20 MG capsule [Pharmacy Med Name: OMEPRAZOLE DR 20 MG CAPSULE] 90 capsule 1     Sig: TAKE ONE CAPSULE BY MOUTH DAILY      Last office visit with prescribing clinician: 11/1/2022   Last telemedicine visit with prescribing clinician: 12/21/2022   Next office visit with prescribing clinician: 12/21/2022   {TIP  Encounters:23}                      Would you like a call back once the refill request has been completed: [] Yes [] No    If the office needs to give you a call back, can they leave a voicemail: [] Yes [] No    Елена Del Valle MA  12/19/22, 14:55 EST

## 2022-12-21 ENCOUNTER — OFFICE VISIT (OUTPATIENT)
Dept: FAMILY MEDICINE CLINIC | Facility: CLINIC | Age: 47
End: 2022-12-21

## 2022-12-21 VITALS
OXYGEN SATURATION: 98 % | TEMPERATURE: 98.4 F | SYSTOLIC BLOOD PRESSURE: 136 MMHG | DIASTOLIC BLOOD PRESSURE: 82 MMHG | WEIGHT: 188.9 LBS | HEART RATE: 88 BPM | HEIGHT: 70 IN | BODY MASS INDEX: 27.04 KG/M2

## 2022-12-21 DIAGNOSIS — Z13.220 SCREENING FOR CHOLESTEROL LEVEL: ICD-10-CM

## 2022-12-21 DIAGNOSIS — Z13.1 SCREENING FOR DIABETES MELLITUS: ICD-10-CM

## 2022-12-21 DIAGNOSIS — F51.01 PRIMARY INSOMNIA: ICD-10-CM

## 2022-12-21 DIAGNOSIS — Z12.11 SCREEN FOR COLON CANCER: ICD-10-CM

## 2022-12-21 DIAGNOSIS — Z13.0 SCREENING FOR DEFICIENCY ANEMIA: ICD-10-CM

## 2022-12-21 DIAGNOSIS — G89.4 CHRONIC PAIN SYNDROME: ICD-10-CM
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
## 2022-12-21 DIAGNOSIS — F41.9 ANXIETY: ICD-10-CM

## 2022-12-21 DIAGNOSIS — F32.9 REACTIVE DEPRESSION: ICD-10-CM

## 2022-12-21 DIAGNOSIS — Z00.00 PHYSICAL EXAM, ANNUAL: Primary | ICD-10-CM

## 2022-12-21 DIAGNOSIS — Z13.29 SCREENING FOR THYROID DISORDER: ICD-10-CM

## 2022-12-21 PROCEDURE — 99396 PREV VISIT EST AGE 40-64: CPT | Performed by: PHYSICIAN ASSISTANT

## 2022-12-21 RX ORDER — GABAPENTIN 300 MG/1
CAPSULE ORAL
COMMUNITY
Start: 2022-12-20

## 2022-12-21 RX ORDER — IBUPROFEN 600 MG/1
600 TABLET ORAL EVERY 6 HOURS PRN
Qty: 120 TABLET | Refills: 1 | Status: SHIPPED | OUTPATIENT
Start: 2022-12-21 | End: 2023-02-20 | Stop reason: SDUPTHER

## 2022-12-21 RX ORDER — OMEPRAZOLE 10 MG/1
10 CAPSULE, DELAYED RELEASE ORAL DAILY
COMMUNITY
End: 2022-12-21

## 2022-12-21 RX ORDER — SERTRALINE HYDROCHLORIDE 25 MG/1
TABLET, FILM COATED ORAL
COMMUNITY
Start: 2022-11-22 | End: 2023-02-23

## 2022-12-21 RX ORDER — PSEUDOEPHED/ACETAMINOPH/DIPHEN 30MG-500MG
1000 TABLET ORAL EVERY 8 HOURS PRN
Qty: 180 TABLET | Refills: 1 | Status: SHIPPED | OUTPATIENT
Start: 2022-12-21 | End: 2023-02-20 | Stop reason: SDUPTHER

## 2022-12-21 RX ORDER — DOXEPIN HYDROCHLORIDE 10 MG/1
CAPSULE ORAL
COMMUNITY
Start: 2022-12-19 | End: 2023-02-23
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
## 2022-12-27 ENCOUNTER — TELEPHONE (OUTPATIENT)
Dept: FAMILY MEDICINE CLINIC | Facility: CLINIC | Age: 47
End: 2022-12-27

## 2022-12-27 NOTE — TELEPHONE ENCOUNTER
Caller: Brock Bateman    Relationship: Self    Best call back number: [phone]      What was the call regarding: PATIENT MISPLACED/LOST THE HANDICAPPED PLACARD HE RECEIVED. REQUESTING A NEW ONE    Do you require a callback: IF NECESSARY

## 2023-01-06 NOTE — TELEPHONE ENCOUNTER
Caller: Brock Bateman    Relationship to patient: Self    Best call back number: [phone]    Patient is needing: PATIENT IS STILL WAITING TO HEAR BACK ABOUT HIS HANDICAP PLACARD. PATIENT IS HAVING DIFFICULTY GETTING ANY SHOPPING DUE TO LACK OF PLACARD. PLEASE ADVISE.

## 2023-02-16 ENCOUNTER — LAB (OUTPATIENT)
Dept: LAB | Facility: HOSPITAL | Age: 48
End: 2023-02-16
Payer: COMMERCIAL

## 2023-02-16 DIAGNOSIS — Z00.00 PHYSICAL EXAM, ANNUAL: ICD-10-CM

## 2023-02-16 DIAGNOSIS — Z13.220 SCREENING FOR CHOLESTEROL LEVEL: ICD-10-CM

## 2023-02-16 DIAGNOSIS — Z13.0 SCREENING FOR DEFICIENCY ANEMIA: ICD-10-CM

## 2023-02-16 DIAGNOSIS — Z13.29 SCREENING FOR THYROID DISORDER: ICD-10-CM

## 2023-02-16 DIAGNOSIS — Z13.1 SCREENING FOR DIABETES MELLITUS: ICD-10-CM

## 2023-02-16 LAB
ALBUMIN SERPL-MCNC: 4.6 G/DL (ref 3.5–5.2)
ALBUMIN/GLOB SERPL: 1.7 G/DL
ALP SERPL-CCNC: 188 U/L (ref 39–117)
ALT SERPL W P-5'-P-CCNC: 13 U/L (ref 1–41)
ANION GAP SERPL CALCULATED.3IONS-SCNC: 9.7 MMOL/L (ref 5–15)
AST SERPL-CCNC: 13 U/L (ref 1–40)
BILIRUB SERPL-MCNC: 0.2 MG/DL (ref 0–1.2)
BUN SERPL-MCNC: 13 MG/DL (ref 6–20)
BUN/CREAT SERPL: 14.4 (ref 7–25)
CALCIUM SPEC-SCNC: 10 MG/DL (ref 8.6–10.5)
CHLORIDE SERPL-SCNC: 105 MMOL/L (ref 98–107)
CHOLEST SERPL-MCNC: 161 MG/DL (ref 0–200)
CO2 SERPL-SCNC: 26.3 MMOL/L (ref 22–29)
CREAT SERPL-MCNC: 0.9 MG/DL (ref 0.76–1.27)
DEPRECATED RDW RBC AUTO: 43.2 FL (ref 37–54)
EGFRCR SERPLBLD CKD-EPI 2021: 106.7 ML/MIN/1.73
ERYTHROCYTE [DISTWIDTH] IN BLOOD BY AUTOMATED COUNT: 13.1 % (ref 12.3–15.4)
GLOBULIN UR ELPH-MCNC: 2.7 GM/DL
GLUCOSE SERPL-MCNC: 94 MG/DL (ref 65–99)
HCT VFR BLD AUTO: 45 % (ref 37.5–51)
HDLC SERPL-MCNC: 34 MG/DL (ref 40–60)
HGB BLD-MCNC: 15.1 G/DL (ref 13–17.7)
LDLC SERPL CALC-MCNC: 95 MG/DL (ref 0–100)
LDLC/HDLC SERPL: 2.66 {RATIO}
MCH RBC QN AUTO: 30.3 PG (ref 26.6–33)
MCHC RBC AUTO-ENTMCNC: 33.6 G/DL (ref 31.5–35.7)
MCV RBC AUTO: 90.4 FL (ref 79–97)
PLATELET # BLD AUTO: 340 10*3/MM3 (ref 140–450)
PMV BLD AUTO: 9.3 FL (ref 6–12)
POTASSIUM SERPL-SCNC: 4.7 MMOL/L (ref 3.5–5.2)
PROT SERPL-MCNC: 7.3 G/DL (ref 6–8.5)
RBC # BLD AUTO: 4.98 10*6/MM3 (ref 4.14–5.8)
SODIUM SERPL-SCNC: 141 MMOL/L (ref 136–145)
TRIGL SERPL-MCNC: 182 MG/DL (ref 0–150)
TSH SERPL DL<=0.05 MIU/L-ACNC: 1.92 UIU/ML (ref 0.27–4.2)
VLDLC SERPL-MCNC: 32 MG/DL (ref 5–40)
WBC NRBC COR # BLD: 7.53 10*3/MM3 (ref 3.4–10.8)

## 2023-02-16 PROCEDURE — 84443 ASSAY THYROID STIM HORMONE: CPT

## 2023-02-16 PROCEDURE — 80053 COMPREHEN METABOLIC PANEL: CPT

## 2023-02-16 PROCEDURE — 80061 LIPID PANEL: CPT

## 2023-02-16 PROCEDURE — 85027 COMPLETE CBC AUTOMATED: CPT

## 2023-02-20 DIAGNOSIS — G89.4 CHRONIC PAIN SYNDROME: ICD-10-CM

## 2023-02-20 RX ORDER — ACETAMINOPHEN 500 MG
TABLET ORAL
Qty: 180 TABLET | Refills: 1 | Status: SHIPPED | OUTPATIENT
Start: 2023-02-20 | End: 2023-05-21

## 2023-02-20 RX ORDER — IBUPROFEN 600 MG/1
TABLET ORAL
Qty: 120 TABLET | Refills: 1 | Status: SHIPPED | OUTPATIENT
Start: 2023-02-20

## 2023-02-20 NOTE — TELEPHONE ENCOUNTER
Rx Refill Note  Requested Prescriptions     Pending Prescriptions Disp Refills   • acetaminophen (TYLENOL) 500 MG tablet [Pharmacy Med Name: ACETAMINOPHEN 500 MG TABLET] 180 tablet 1     Sig: TAKE TWO TABLETS BY MOUTH EVERY 8 HOURS AS NEEDED FOR MODERATE PAIN   • ibuprofen (ADVIL,MOTRIN) 600 MG tablet [Pharmacy Med Name: IBUPROFEN 600 MG TABLET] 120 tablet 1     Sig: TAKE ONE TABLET BY MOUTH EVERY 6 HOURS AS NEEDED FOR MODERATE PAIN      Last office visit with prescribing clinician: 12/21/2022   Last telemedicine visit with prescribing clinician: Visit date not found   Next office visit with prescribing clinician: 12/28/2023                         Would you like a call back once the refill request has been completed: [] Yes [] No    If the office needs to give you a call back, can they leave a voicemail: [] Yes [] No    Rudy Triana MA  02/20/23, 14:21 EST

## 2023-02-23 ENCOUNTER — TELEMEDICINE (OUTPATIENT)
Dept: FAMILY MEDICINE CLINIC | Facility: CLINIC | Age: 48
End: 2023-02-23
Payer: COMMERCIAL

## 2023-02-23 ENCOUNTER — HOSPITAL ENCOUNTER (OUTPATIENT)
Dept: PHYSICAL THERAPY | Facility: HOSPITAL | Age: 48
Setting detail: THERAPIES SERIES
Discharge: HOME | End: 2023-02-23
Payer: COMMERCIAL

## 2023-02-23 ENCOUNTER — TRANSCRIBE ORDERS (OUTPATIENT)
Dept: PHYSICAL THERAPY | Facility: HOSPITAL | Age: 48
End: 2023-02-23
Payer: COMMERCIAL

## 2023-02-23 DIAGNOSIS — M79.605 LEFT LEG PAIN: Primary | ICD-10-CM

## 2023-02-23 DIAGNOSIS — E55.9 VITAMIN D DEFICIENCY: ICD-10-CM

## 2023-02-23 DIAGNOSIS — F41.9 ANXIETY: Primary | ICD-10-CM

## 2023-02-23 DIAGNOSIS — M79.605 ACUTE PAIN OF LEFT LOWER EXTREMITY: Primary | ICD-10-CM

## 2023-02-23 PROCEDURE — 97161 PT EVAL LOW COMPLEX 20 MIN: CPT

## 2023-02-23 PROCEDURE — 99213 OFFICE O/P EST LOW 20 MIN: CPT | Performed by: PHYSICIAN ASSISTANT

## 2023-02-23 RX ORDER — MELATONIN
6000 DAILY
COMMUNITY

## 2023-02-23 NOTE — PROGRESS NOTES
Chief Complaint   Patient presents with   • Anxiety         Brock Bateman is a 47 y.o. male who presents for Anxiety.  Patient reports that he is doing well overall.  He discontinued all of his behavioral health medications due to side effects.  No longer following with behavioral health for medicine or counseling.  States he is doing well overall.  He is using marijuana for anxiety and this is helpful.  He is interested in Marijuana Card.      Past Medical History:   Diagnosis Date   • Anxiety    • GERD (gastroesophageal reflux disease)    • Moderate episode of recurrent major depressive disorder (HCC) 07/10/2020   • Night terrors, adult     From Accident   • Primary insomnia 07/10/2020   • Primary osteoarthritis of both knees 07/10/2020       Past Surgical History:   Procedure Laterality Date   • CHEST SURGERY Left     Chest Wall  Left   • LEG SURGERY Right    • PLACEMENT OF WOUND VAC     • SKIN GRAFT         Family History   Problem Relation Age of Onset   • Acute myelogenous leukemia Mother    • Diabetes Mother    • Lung cancer Father         smoker   • Kidney disease Maternal Grandmother    • Colon cancer Neg Hx    • Prostate cancer Neg Hx        Social History     Socioeconomic History   • Marital status: Single   Tobacco Use   • Smoking status: Every Day     Packs/day: 1.00     Years: 15.00     Pack years: 15.00     Types: Cigarettes     Start date: 1/1/1987   • Smokeless tobacco: Former     Quit date: 06/2020   Vaping Use   • Vaping Use: Never used   Substance and Sexual Activity   • Alcohol use: Not Currently     Comment: stopped 3/07   • Drug use: Never   • Sexual activity: Not Currently       Allergies   Allergen Reactions   • Cymbalta [Duloxetine Hcl] Dizziness   • Prazosin Dizziness   • Remeron [Mirtazapine] Dizziness   • Trazodone Other (See Comments)     Caused insomnia, blurred vision       ROS    Review of Systems   Psychiatric/Behavioral: Negative for agitation, sleep disturbance, suicidal  ideas, depressed mood and stress. The patient is not nervous/anxious.        There were no vitals filed for this visit.  There is no height or weight on file to calculate BMI.    Current Outpatient Medications on File Prior to Visit   Medication Sig Dispense Refill   • acetaminophen (TYLENOL) 500 MG tablet TAKE TWO TABLETS BY MOUTH EVERY 8 HOURS AS NEEDED FOR MODERATE PAIN 180 tablet 1   • cholecalciferol (VITAMIN D3) 25 MCG (1000 UT) tablet Take 6,000 Units by mouth Daily.     • ibuprofen (ADVIL,MOTRIN) 600 MG tablet TAKE ONE TABLET BY MOUTH EVERY 6 HOURS AS NEEDED FOR MODERATE PAIN 120 tablet 1   • gabapentin (NEURONTIN) 300 MG capsule      • omeprazole (priLOSEC) 20 MG capsule TAKE ONE CAPSULE BY MOUTH DAILY 90 capsule 1   • [DISCONTINUED] doxepin (SINEquan) 10 MG capsule      • [DISCONTINUED] sertraline (ZOLOFT) 25 MG tablet        No current facility-administered medications on file prior to visit.       Results for orders placed or performed in visit on 02/16/23   CBC (No Diff)    Specimen: Blood   Result Value Ref Range    WBC 7.53 3.40 - 10.80 10*3/mm3    RBC 4.98 4.14 - 5.80 10*6/mm3    Hemoglobin 15.1 13.0 - 17.7 g/dL    Hematocrit 45.0 37.5 - 51.0 %    MCV 90.4 79.0 - 97.0 fL    MCH 30.3 26.6 - 33.0 pg    MCHC 33.6 31.5 - 35.7 g/dL    RDW 13.1 12.3 - 15.4 %    RDW-SD 43.2 37.0 - 54.0 fl    MPV 9.3 6.0 - 12.0 fL    Platelets 340 140 - 450 10*3/mm3   Comprehensive Metabolic Panel    Specimen: Blood   Result Value Ref Range    Glucose 94 65 - 99 mg/dL    BUN 13 6 - 20 mg/dL    Creatinine 0.90 0.76 - 1.27 mg/dL    Sodium 141 136 - 145 mmol/L    Potassium 4.7 3.5 - 5.2 mmol/L    Chloride 105 98 - 107 mmol/L    CO2 26.3 22.0 - 29.0 mmol/L    Calcium 10.0 8.6 - 10.5 mg/dL    Total Protein 7.3 6.0 - 8.5 g/dL    Albumin 4.6 3.5 - 5.2 g/dL    ALT (SGPT) 13 1 - 41 U/L    AST (SGOT) 13 1 - 40 U/L    Alkaline Phosphatase 188 (H) 39 - 117 U/L    Total Bilirubin 0.2 0.0 - 1.2 mg/dL    Globulin 2.7 gm/dL    A/G Ratio 1.7  g/dL    BUN/Creatinine Ratio 14.4 7.0 - 25.0    Anion Gap 9.7 5.0 - 15.0 mmol/L    eGFR 106.7 >60.0 mL/min/1.73   TSH Rfx On Abnormal To Free T4    Specimen: Blood   Result Value Ref Range    TSH 1.920 0.270 - 4.200 uIU/mL   Lipid Panel    Specimen: Blood   Result Value Ref Range    Total Cholesterol 161 0 - 200 mg/dL    Triglycerides 182 (H) 0 - 150 mg/dL    HDL Cholesterol 34 (L) 40 - 60 mg/dL    LDL Cholesterol  95 0 - 100 mg/dL    VLDL Cholesterol 32 5 - 40 mg/dL    LDL/HDL Ratio 2.66        PE    Physical Exam  Vitals reviewed.   Constitutional:       General: He is not in acute distress.     Appearance: Normal appearance. He is well-developed and normal weight. He is not ill-appearing or diaphoretic.   HENT:      Head: Normocephalic and atraumatic.   Eyes:      Extraocular Movements: Extraocular movements intact.      Conjunctiva/sclera: Conjunctivae normal.   Pulmonary:      Effort: No respiratory distress.   Musculoskeletal:         General: Normal range of motion.      Cervical back: Normal range of motion.   Neurological:      General: No focal deficit present.      Mental Status: He is alert.   Psychiatric:         Attention and Perception: Attention and perception normal. He is attentive.         Mood and Affect: Mood and affect normal.         Speech: Speech normal.         Behavior: Behavior normal. Behavior is cooperative.         Thought Content: Thought content normal.         Cognition and Memory: Cognition and memory normal.         Judgment: Judgment normal.          A/P    Diagnoses and all orders for this visit:    1. Anxiety (Primary)  Managing without medication.  Has stopped all medicine and is not going to behavioral specialist anymore.  States he is doing well overall.  Is using marijuana at times and is interested in medical card.  Discussed with him that our office does not provide these.  I have heard that he can obtain one from Dr. PAYAL Nicholas office.  Patient will call and check  around.    2. Vitamin D deficiency  Taking vitamin D3 6,000 units daily.  Plan on repeating vitamin D supplementation at next annual exam.    You have chosen to receive care through a telehealth visit.  Do you consent to use a video/audio connection for your medical care today? Yes.  Patient is at home in [place].  Provider is at home in [place].       Plan of care reviewed with patient at the conclusion of today's visit. Education was provided regarding diagnosis, management and any prescribed or recommended OTC medications.  Patient verbalizes understanding of and agreement with management plan.    Dictated Utilizing Dragon Dictation     Please note that portions of this note were completed with a voice recognition program.     Part of this note may be an electronic transcription/translation of spoken language to printed text using the Dragon Dictation System.    No follow-ups on file.     Kelley Tang PA-C
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
## 2023-03-09 ENCOUNTER — HOSPITAL ENCOUNTER (OUTPATIENT)
Dept: PHYSICAL THERAPY | Facility: HOSPITAL | Age: 48
Setting detail: THERAPIES SERIES
Discharge: HOME | End: 2023-03-09
Payer: COMMERCIAL

## 2023-03-09 DIAGNOSIS — M79.605 LEFT LEG PAIN: Primary | ICD-10-CM

## 2023-03-09 PROCEDURE — 97110 THERAPEUTIC EXERCISES: CPT
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
## 2023-03-23 ENCOUNTER — HOSPITAL ENCOUNTER (OUTPATIENT)
Dept: PHYSICAL THERAPY | Facility: HOSPITAL | Age: 48
Setting detail: THERAPIES SERIES
Discharge: HOME | End: 2023-03-23
Payer: COMMERCIAL

## 2023-03-23 DIAGNOSIS — M79.605 LEFT LEG PAIN: Primary | ICD-10-CM

## 2023-03-23 PROCEDURE — 97110 THERAPEUTIC EXERCISES: CPT
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
## 2023-03-30 ENCOUNTER — HOSPITAL ENCOUNTER (OUTPATIENT)
Dept: PHYSICAL THERAPY | Facility: HOSPITAL | Age: 48
Setting detail: THERAPIES SERIES
Discharge: HOME | End: 2023-03-30
Payer: COMMERCIAL

## 2023-04-05 ENCOUNTER — APPOINTMENT (OUTPATIENT)
Dept: PHYSICAL THERAPY | Facility: HOSPITAL | Age: 48
End: 2023-04-05
Payer: COMMERCIAL

## 2023-04-12 ENCOUNTER — HOSPITAL ENCOUNTER (OUTPATIENT)
Dept: PHYSICAL THERAPY | Facility: HOSPITAL | Age: 48
Setting detail: THERAPIES SERIES
Discharge: HOME | End: 2023-04-12
Payer: COMMERCIAL

## 2023-04-12 PROCEDURE — 97110 THERAPEUTIC EXERCISES: CPT
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
## 2023-04-26 ENCOUNTER — DOCUMENTATION (OUTPATIENT)
Dept: PHYSICAL THERAPY | Facility: HOSPITAL | Age: 48
End: 2023-04-26
Payer: COMMERCIAL

## 2023-04-26 DIAGNOSIS — M79.605 LEFT LEG PAIN: Primary | ICD-10-CM

## 2023-04-26 NOTE — THERAPY DISCHARGE NOTE
Outpatient Physical Therapy Discharge Summary         Patient Name: Brock Bateman  : 1975  MRN: [id number]    Today's Date: 2023    Visit Dx:    ICD-10-CM ICD-9-CM   1. Left leg pain  M79.605 729.5           OP PT Discharge Summary  Date of Discharge: 23  Reason for Discharge: Non-compliant  Discharge Instructions/Additional Comments: Patient evaluated on 2023 for left leg pain/dysfunction and returned for only 3 follow-up visits since then.  Patient has no showed for 2 consecutive visits, therefore, the patient will be discharged from PT caseload per clinic policy.      Time Calculation:                    Maxime Bernstein, PT  2023

## 2023-04-28 ENCOUNTER — TELEPHONE (OUTPATIENT)
Dept: FAMILY MEDICINE CLINIC | Facility: CLINIC | Age: 48
End: 2023-04-28
Payer: COMMERCIAL

## 2023-04-28 NOTE — TELEPHONE ENCOUNTER
Pt called about some medications he is having some problems with and he is also having a problem with his physical therapy. He is requesting a call back from either franc or his MA thank you.

## 2023-05-02 NOTE — TELEPHONE ENCOUNTER
Patient returned call to ask about medicines prescribed by his psychiatrist. I advised him to contact their office to discuss this further    Provided contact info for behavioral health

## 2023-05-09 ENCOUNTER — PATIENT MESSAGE (OUTPATIENT)
Dept: FAMILY MEDICINE CLINIC | Facility: CLINIC | Age: 48
End: 2023-05-09
Payer: COMMERCIAL

## 2023-05-26 ENCOUNTER — TELEMEDICINE (OUTPATIENT)
Dept: FAMILY MEDICINE CLINIC | Facility: CLINIC | Age: 48
End: 2023-05-26
Payer: COMMERCIAL

## 2023-05-26 ENCOUNTER — NURSE TRIAGE (OUTPATIENT)
Dept: CALL CENTER | Facility: HOSPITAL | Age: 48
End: 2023-05-26
Payer: COMMERCIAL

## 2023-05-26 DIAGNOSIS — F33.2 SEVERE EPISODE OF RECURRENT MAJOR DEPRESSIVE DISORDER, WITHOUT PSYCHOTIC FEATURES: Primary | ICD-10-CM

## 2023-05-26 PROCEDURE — 99213 OFFICE O/P EST LOW 20 MIN: CPT | Performed by: NURSE PRACTITIONER

## 2023-05-26 PROCEDURE — 1159F MED LIST DOCD IN RCRD: CPT | Performed by: NURSE PRACTITIONER

## 2023-05-26 PROCEDURE — 1160F RVW MEDS BY RX/DR IN RCRD: CPT | Performed by: NURSE PRACTITIONER

## 2023-05-26 RX ORDER — BUPROPION HYDROCHLORIDE 150 MG/1
150 TABLET ORAL DAILY
Qty: 14 TABLET | Refills: 0 | Status: SHIPPED | OUTPATIENT
Start: 2023-05-26
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
## 2023-05-30 ENCOUNTER — REFERRAL TRIAGE (OUTPATIENT)
Dept: CASE MANAGEMENT | Facility: CLINIC | Age: 48
End: 2023-05-30

## 2023-06-02 ENCOUNTER — OFFICE VISIT (OUTPATIENT)
Dept: FAMILY MEDICINE CLINIC | Facility: CLINIC | Age: 48
End: 2023-06-02

## 2023-06-02 VITALS
DIASTOLIC BLOOD PRESSURE: 78 MMHG | OXYGEN SATURATION: 95 % | BODY MASS INDEX: 25.48 KG/M2 | SYSTOLIC BLOOD PRESSURE: 110 MMHG | WEIGHT: 178 LBS | HEIGHT: 70 IN | HEART RATE: 91 BPM

## 2023-06-02 DIAGNOSIS — F33.2 SEVERE EPISODE OF RECURRENT MAJOR DEPRESSIVE DISORDER, WITHOUT PSYCHOTIC FEATURES: Primary | ICD-10-CM

## 2023-06-02 DIAGNOSIS — M21.952: ICD-10-CM

## 2023-06-02 DIAGNOSIS — Z59.9 FINANCIAL DIFFICULTIES: ICD-10-CM

## 2023-06-02 DIAGNOSIS — R45.851 SUICIDAL THOUGHTS: ICD-10-CM

## 2023-06-02 DIAGNOSIS — Z59.811 HOUSING INSTABILITY DUE TO IMMINENT RISK OF HOMELESSNESS: ICD-10-CM

## 2023-06-02 DIAGNOSIS — F43.10 PTSD (POST-TRAUMATIC STRESS DISORDER): ICD-10-CM

## 2023-06-02 PROCEDURE — 1159F MED LIST DOCD IN RCRD: CPT | Performed by: PHYSICIAN ASSISTANT

## 2023-06-02 PROCEDURE — 99214 OFFICE O/P EST MOD 30 MIN: CPT | Performed by: PHYSICIAN ASSISTANT

## 2023-06-02 PROCEDURE — 1160F RVW MEDS BY RX/DR IN RCRD: CPT | Performed by: PHYSICIAN ASSISTANT

## 2023-06-02 RX ORDER — BUPROPION HYDROCHLORIDE 300 MG/1
300 TABLET ORAL EVERY MORNING
Qty: 30 TABLET | Refills: 0 | Status: SHIPPED | OUTPATIENT
Start: 2023-06-02

## 2023-06-02 SDOH — ECONOMIC STABILITY - INCOME SECURITY: PROBLEM RELATED TO HOUSING AND ECONOMIC CIRCUMSTANCES, UNSPECIFIED: Z59.9

## 2023-06-02 SDOH — ECONOMIC STABILITY - HOUSING INSECURITY: HOUSING INSTABILITY WITH RISK OF HOMELESSNESS: Z59.811
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
## 2023-06-06 ENCOUNTER — REFERRAL TRIAGE (OUTPATIENT)
Dept: CASE MANAGEMENT | Facility: OTHER | Age: 48
End: 2023-06-06
Payer: COMMERCIAL

## 2023-06-06 ENCOUNTER — PATIENT OUTREACH (OUTPATIENT)
Dept: CASE MANAGEMENT | Facility: CLINIC | Age: 48
End: 2023-06-06
Payer: COMMERCIAL

## 2023-06-06 NOTE — OUTREACH NOTE
Social Work Assessment  Questions/Answers      Flowsheet Row Most Recent Value   Referral Source physician, outpatient staff, outpatient clinic   Reason for Consult community resources, mental health concerns   Preferred Language English   Advance Care Planning Reviewed no concerns identified   People in Home alone   Current Living Arrangements home   Potentially Unsafe Housing Conditions aggressive pet(s)   Primary Care Provided by self   Provides Primary Care For no one   Family Caregiver if Needed friend(s)   Able to Return to Prior Arrangements yes   Employment Status unemployed   Source of Income none   Financial/Environmental Concerns unemployed   Application for Public Assistance applied  [SSDI denied , encourged to reapply]   Usual Activity Tolerance moderate   Current Activity Tolerance moderate   Medications independent   Meal Preparation independent   Housekeeping independent   Laundry independent   Shopping independent          SDOH updated and reviewed with the patient during this program:  Financial Resource Strain: High Risk    Difficulty of Paying Living Expenses: Hard      Food Insecurity: No Food Insecurity    Worried About Running Out of Food in the Last Year: Never true    Ran Out of Food in the Last Year: Never true      Transportation Needs: No Transportation Needs    Lack of Transportation (Medical): No    Lack of Transportation (Non-Medical): No      Housing Stability: Low Risk     Unable to Pay for Housing in the Last Year: No    Number of Places Lived in the Last Year: 1    Unstable Housing in the Last Year: No     Patient Outreach    SW received referral via PCP re: mental  health concerns. SW called and spoke to patient via telephone. At time of conversation, patient was upset as he just learned that the vet advised him to put his dog down due to aggression and a recent attack on another dog. Patient reports new from the vet has increased his anxiety and depression. Denies SI./HI at this  time. Patient lives alone, drives himself to medical appts, and utilizes a cane for ADL's. Patient reports he applied for SSDI last year, but was denied. SW advised patient to reapply. Patient is unemployed at this time, but reports is NOT at risk of losing his housing at this time. During phone call, patient reports receiving another call from a mental health office to schedule appt. SW encouraged patient to take the call and schedule an appt for mental health concerns. SW to follow up x1 week to ensure appt was scheduled and address additional concerns, if needed.     Maricruz ALBERT -   Ambulatory Case Management    6/6/2023, 08:43 EDT

## 2023-06-08 ENCOUNTER — TELEPHONE (OUTPATIENT)
Dept: FAMILY MEDICINE CLINIC | Facility: CLINIC | Age: 48
End: 2023-06-08
Payer: COMMERCIAL

## 2023-06-08 NOTE — TELEPHONE ENCOUNTER
PT CALLED REGARDING THE MEDICATION HE WAS PRESCRIBED YESTERDAY, HE'S STATED THAT HE LOOKED THE MEDICATION UP AND DOESN'T LIKE THE SIDE EFFECTS THAT COME ALONG WITH IT AND IS ASKING TO BE SWITCHED TO SOMETHING ELSE..      PLEASE ADVISE

## 2023-06-08 NOTE — TELEPHONE ENCOUNTER
Patient is uncomfortable with starting Bupropion he is concerned about seizures. Advised him that PCP is out of office. He verbalized understanding and willing to wait for PCP to return for an alternative.

## 2023-06-12 ENCOUNTER — TELEPHONE (OUTPATIENT)
Dept: FAMILY MEDICINE CLINIC | Facility: CLINIC | Age: 48
End: 2023-06-12
Payer: COMMERCIAL

## 2023-06-12 ENCOUNTER — PATIENT OUTREACH (OUTPATIENT)
Dept: CASE MANAGEMENT | Facility: CLINIC | Age: 48
End: 2023-06-12
Payer: COMMERCIAL

## 2023-06-12 ENCOUNTER — PATIENT OUTREACH (OUTPATIENT)
Dept: CASE MANAGEMENT | Facility: OTHER | Age: 48
End: 2023-06-12
Payer: COMMERCIAL

## 2023-06-12 NOTE — TELEPHONE ENCOUNTER
"Contacted patient to notify. He was very silent during call. I checked to make sure he could understand. Patient responded that he has a plan already set up and that he has plenty of gun ammunition.     I asked patient to repeat himself, as his words were very slurred. He repeated \"I have plenty of gun ammo and do not call the  or it will be a gunfight, and my death will be on your hands.\" Call was ended.      "

## 2023-06-12 NOTE — TELEPHONE ENCOUNTER
"  Caller: Brock Bateman \"Ac\"    Relationship: Self    Best call back number: [phone]    What is the best time to reach you: ANYTIME    Who are you requesting to speak with (clinical staff, provider,  specific staff member): ABHISHEK HILL    Do you know the name of the person who called: BROCK RAZA    What was the call regarding: WOULD NOT GIVE DETAILS    Is it okay if the provider responds through MyChart: NA        "

## 2023-06-12 NOTE — OUTREACH NOTE
"Patient Outreach    SW called patient to discuss previous outreach re:  \"mental health appt\". Patient spoke to patient via telephone to determine status of upcoming mental health appt. Patient verbalized no plans to keep upcoming appt. SW inquired as to why he did not plan to keep appt. Patient then reports, \"he is currently loading a pistol and plans to kill himself\". SW provided empathetic listening and crisis interventions while attempting to keep patient on the phone. Patient ended phone call. SW called back with no answer. SW then contacted supervisor (MARIBEL Dozier) to contact patient while this SW contact local authorities to complete welfare check.     Care Coordination    SW contacted 911 and was transferred to local [place] police department. SW provided dispatch (Allyson) with patient home address and provided information re: statements patient has made re: immediate intent to harm himself. Local PD/FD arrived at patient address listed in Epic Demographic. Police cleared home and determined no one was home.    Care Coordination    Per Tasia, patient was picked up by a friend and was at a friends home, at this time. Patient refused to provide location or information of friend.   After >25minutes, dispatch ended call with this SW as PD/FD unable to locate patient, at this time. Per dispatch, note made to contact this SW with updates.     PCP aware of situation and involved, as well.     Maricruz ALBERT -   Ambulatory Case Management    6/12/2023, 14:45 EDT        "

## 2023-06-12 NOTE — TELEPHONE ENCOUNTER
Per Kelley, there's nothing we can call in for this right now. She wanted to get the name of his physiatrist from , but he can't find the folder that he was given. He will be here on Wednesday and seeing behavioral health on Thursday.

## 2023-06-12 NOTE — TELEPHONE ENCOUNTER
"Patient is requesting a medication to \"keep him sedated\" and help him not go crazy until his appointment on Wednesday. Would like it called into [facility]. I urged him to go to ER if he feels he needs \"sedated\" but he refuses.  "

## 2023-06-12 NOTE — TELEPHONE ENCOUNTER
Okay that is fine.  Wellbutrin can lower the seizure threshold.  It is usually only a concern if a patient has had a history of seizure - I don't see that in his chart.  He has upcoming appointment with behavioral health on 06/15 and can discuss other options.

## 2023-06-12 NOTE — OUTREACH NOTE
"Patient Outreach    Contacted patient via phone, while another staff member, Maricruz, contacted the [place] [employer] for a welfare check due to imminent harm to self. Attempted to build rapport with pt to understand his current situation. Pt indicated he did not have any income because he was denied social security disability. Pt reported his lease is up on his home, and he had to be out by the end of the month. Pt expressed concern with being able to find a home by the end of the month, and he did not think he would be able to take his dogs with him. Pt indicated he had a friend come to his home to pick him up because he was \"not doing well\". Pt reported he is at the friend's home now, and the friend is at home with him. Pt refused to provide the friend's name or address because \"you will send the police here\". Provided this update regarding the pt's location to Maricruz, who relayed the information to the [place] [employer] Dispatch. Reiterated I was concerned for his safety, and I wanted to help him. Discussed the services provided in Ambulatory Case Management as well as services provided through behavioral health. Pt indicated he did want to see a psychiatrist, but \"I don't want any medicine\". Discussed therapy services. Pt then received a call from \"Dr. Tang's office\" and indicated he had to take the call. Stayed on the line until the line disconnected. Contacted the PCP office and spoke with the Clinical Coordinator. Clinical Coordinator reported they did have the pt on the phone, but he was not speaking with them. Clinical Coordinator indicated Kelley, PCP, is aware of the situation as the pt expressed intent to harm self to the office as well. Informed office staff to attempt to get a location from the pt and inform police for a welfare check if obtained.     Tasia  Ambulatory Case Management    "

## 2023-06-13 ENCOUNTER — TELEPHONE (OUTPATIENT)
Dept: FAMILY MEDICINE CLINIC | Facility: CLINIC | Age: 48
End: 2023-06-13
Payer: COMMERCIAL

## 2023-06-13 NOTE — TELEPHONE ENCOUNTER
Have talked with patient extensively.  Recommend he go to ER.  Not comfortable prescribing him any medications at this time.  He admits to obtaining medication via Internet - unclear what he is taking.  He has psychiatric appointment on 06/15 and was previously seen by [facility] although he is unsure who he saw.
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
## 2023-06-13 NOTE — TELEPHONE ENCOUNTER
"Spoke with patient about suicidal ideation. He says he wants to \"end it all\". He spoke with Kelley and myself for a long time. We urged him to let us call an ambulance. He needs to be admitted, and he refuses as he's afraid he will be arrested if he leaves his home. He also can't leave his 2 dogs home alone. He did agree to see psychiatry and do outpatient therapy. He made appointment to see Kelley in person on Wednesday and he has appointment with behavioral health on Thursday. By the end of the call, he states he feels better and says he does not have guns at home, he only said that to scare us. He does not have a plan to harm himself at this time.   "

## 2023-06-13 NOTE — TELEPHONE ENCOUNTER
Pt called the office to speak with his . I inform ed he called the wrong office and that this is his PCP's office. Information for  was given to patient.

## 2023-06-14 ENCOUNTER — PATIENT OUTREACH (OUTPATIENT)
Dept: CASE MANAGEMENT | Facility: CLINIC | Age: 48
End: 2023-06-14
Payer: COMMERCIAL

## 2023-06-14 NOTE — OUTREACH NOTE
Patient Outreach    SW received call from patient. Patient declines further assistance from Ambulatory Social Work at this time. Please re consult SW if patient agreeable / if additional needs arise.     Maricruz ALBERT -   Ambulatory Case Management    6/14/2023, 10:16 EDT

## 2024-05-23 ENCOUNTER — PRE-ADMISSION TESTING (OUTPATIENT)
Dept: PREADMISSION TESTING | Facility: HOSPITAL | Age: 49
End: 2024-05-23
Payer: COMMERCIAL

## 2024-05-23 ENCOUNTER — HOSPITAL ENCOUNTER (OUTPATIENT)
Dept: GENERAL RADIOLOGY | Facility: HOSPITAL | Age: 49
Discharge: HOME | End: 2024-05-23
Payer: COMMERCIAL

## 2024-05-23 VITALS — HEIGHT: 70 IN | BODY MASS INDEX: 20.34 KG/M2 | WEIGHT: 142.09 LBS

## 2024-05-23 LAB
ANION GAP SERPL CALCULATED.3IONS-SCNC: 10 MMOL/L (ref 5–15)
BUN SERPL-MCNC: 13 MG/DL (ref 6–20)
BUN/CREAT SERPL: 14.9 (ref 7–25)
CALCIUM SPEC-SCNC: 9 MG/DL (ref 8.6–10.5)
CHLORIDE SERPL-SCNC: 101 MMOL/L (ref 98–107)
CO2 SERPL-SCNC: 27 MMOL/L (ref 22–29)
CREAT SERPL-MCNC: 0.87 MG/DL (ref 0.76–1.27)
DEPRECATED RDW RBC AUTO: 44.2 FL (ref 37–54)
EGFRCR SERPLBLD CKD-EPI 2021: 107.1 ML/MIN/1.73
ERYTHROCYTE [DISTWIDTH] IN BLOOD BY AUTOMATED COUNT: 13 % (ref 12.3–15.4)
GLUCOSE SERPL-MCNC: 84 MG/DL (ref 65–99)
HBA1C MFR BLD: 5 % (ref 4.8–5.6)
HCT VFR BLD AUTO: 46.4 % (ref 37.5–51)
HGB BLD-MCNC: 15.5 G/DL (ref 13–17.7)
MCH RBC QN AUTO: 30.9 PG (ref 26.6–33)
MCHC RBC AUTO-ENTMCNC: 33.4 G/DL (ref 31.5–35.7)
MCV RBC AUTO: 92.4 FL (ref 79–97)
PLATELET # BLD AUTO: 367 10*3/MM3 (ref 140–450)
PMV BLD AUTO: 8.5 FL (ref 6–12)
POTASSIUM SERPL-SCNC: 3.8 MMOL/L (ref 3.5–5.2)
RBC # BLD AUTO: 5.02 10*6/MM3 (ref 4.14–5.8)
SODIUM SERPL-SCNC: 138 MMOL/L (ref 136–145)
WBC NRBC COR # BLD AUTO: 9.43 10*3/MM3 (ref 3.4–10.8)

## 2024-05-23 PROCEDURE — 71046 X-RAY EXAM CHEST 2 VIEWS: CPT

## 2024-05-23 PROCEDURE — 83036 HEMOGLOBIN GLYCOSYLATED A1C: CPT

## 2024-05-23 PROCEDURE — 93010 ELECTROCARDIOGRAM REPORT: CPT | Performed by: INTERNAL MEDICINE

## 2024-05-23 PROCEDURE — 80048 BASIC METABOLIC PNL TOTAL CA: CPT

## 2024-05-23 PROCEDURE — 85027 COMPLETE CBC AUTOMATED: CPT

## 2024-05-23 PROCEDURE — 36415 COLL VENOUS BLD VENIPUNCTURE: CPT

## 2024-05-23 PROCEDURE — 93005 ELECTROCARDIOGRAM TRACING: CPT

## 2024-05-23 NOTE — DISCHARGE INSTRUCTIONS
Patient instructed to drink 20 ounces of Gatorade or Gatorlyte (if diabetic) and it needs to be completed 1 hour (for Main OR patients) or 2 hours (scheduled  section & [facility]PSC patients) before given arrival time for procedure (NO RED Gatorade and NO Gatorade Zero).    Patient verbalized understanding.     Patient's surgeon called in a prescription for Benzol Peroxide 5% wash to [facility].  Patient instructed to  from [facility] pharmacy that was submitted electronically.  Verbal and written instructions given regarding proper use of the Benzoyl Peroxide wash were provided to patient and/or famlily during PAT visit. Patient/family also instructed to complete Benzol Peroxide checklist and return it to Pre-op on the day of surgery.  Patient and/or family verbalized understanding.      Additionally, reinforced with patient to acquire this prescription from the [facility] retail pharmacy before leaving the hospital after PAT visit due to the potential unavailability at local pharmacies.

## 2024-05-23 NOTE — CASE MANAGEMENT/SOCIAL WORK
Continued Stay Note       Patient Name: Brock Bateman  MRN: [id number]  Today's Date: 5/23/2024    Admit Date: (Not on file)    Plan: SW   Discharge Plan       Row Name 05/23/24 1132       Plan    Plan SW    Plan Comments MSW contacted by pre-admission testing regarding Pt’s ability to afford medications. Medications under $20. MSW approved the ability to use indigent funds.                   Discharge Codes    No documentation.                       SIM Mohamud

## 2024-05-23 NOTE — PAT
Patient's surgeon called in a prescription for Benzol Peroxide 5% wash to [facility].  Patient instructed to  from [facility] pharmacy that was submitted electronically.  Verbal and written instructions given regarding proper use of the Benzoyl Peroxide wash were provided to patient and/or famlily during PAT visit. Patient/family also instructed to complete Benzol Peroxide checklist and return it to Pre-op on the day of surgery.  Patient and/or family verbalized understanding.      Additionally, reinforced with patient to acquire this prescription from the [facility] retail pharmacy before leaving the hospital after PAT visit due to the potential unavailability at local pharmacies. ( Spoke with Morena GARZA With the [facility] who will arrange with the retail pharmacy for payment of this RX)    Patient instructed to drink 20 ounces of Gatorade or Gatorlyte (if diabetic) and it needs to be completed 1 hour (for Main OR patients) or 2 hours (scheduled  section & [facility] patients) before given arrival time for procedure (NO RED Gatorade and NO Gatorade Zero).    Patient verbalized understanding.

## 2024-05-26 LAB
QT INTERVAL: 368 MS
QTC INTERVAL: 397 MS

## 2024-05-28 ENCOUNTER — ANESTHESIA (OUTPATIENT)
Dept: PERIOP | Facility: HOSPITAL | Age: 49
End: 2024-05-28
Payer: COMMERCIAL

## 2024-05-28 ENCOUNTER — ANESTHESIA EVENT (OUTPATIENT)
Dept: PERIOP | Facility: HOSPITAL | Age: 49
End: 2024-05-28
Payer: COMMERCIAL

## 2024-05-28 ENCOUNTER — ANESTHESIA EVENT CONVERTED (OUTPATIENT)
Dept: ANESTHESIOLOGY | Facility: HOSPITAL | Age: 49
End: 2024-05-28
Payer: COMMERCIAL

## 2024-05-28 ENCOUNTER — HOSPITAL ENCOUNTER (OUTPATIENT)
Facility: HOSPITAL | Age: 49
Setting detail: HOSPITAL OUTPATIENT SURGERY
Discharge: HOME | End: 2024-05-28
Attending: ORTHOPAEDIC SURGERY | Admitting: ORTHOPAEDIC SURGERY
Payer: COMMERCIAL

## 2024-05-28 VITALS
OXYGEN SATURATION: 98 % | TEMPERATURE: 98.1 F | HEART RATE: 78 BPM | DIASTOLIC BLOOD PRESSURE: 82 MMHG | RESPIRATION RATE: 20 BRPM | SYSTOLIC BLOOD PRESSURE: 123 MMHG

## 2024-05-28 DIAGNOSIS — S43.102A AC SEPARATION, TYPE 3, LEFT, INITIAL ENCOUNTER: Primary | ICD-10-CM

## 2024-05-28 PROCEDURE — 25010000002 CEFAZOLIN PER 500 MG: Performed by: ORTHOPAEDIC SURGERY

## 2024-05-28 PROCEDURE — 25010000002 HYDROMORPHONE 1 MG/ML SOLUTION

## 2024-05-28 PROCEDURE — 25010000002 SUGAMMADEX 200 MG/2ML SOLUTION: Performed by: ANESTHESIOLOGY

## 2024-05-28 PROCEDURE — 25010000002 ROPIVACAINE PER 1 MG: Performed by: NURSE ANESTHETIST, CERTIFIED REGISTERED

## 2024-05-28 PROCEDURE — 25010000002 FENTANYL CITRATE (PF) 50 MCG/ML SOLUTION: Performed by: NURSE ANESTHETIST, CERTIFIED REGISTERED

## 2024-05-28 PROCEDURE — C1713 ANCHOR/SCREW BN/BN,TIS/BN: HCPCS | Performed by: ORTHOPAEDIC SURGERY

## 2024-05-28 PROCEDURE — 25010000002 PROPOFOL 10 MG/ML EMULSION: Performed by: ANESTHESIOLOGY

## 2024-05-28 PROCEDURE — C1769 GUIDE WIRE: HCPCS | Performed by: ORTHOPAEDIC SURGERY

## 2024-05-28 PROCEDURE — 25010000002 ONDANSETRON PER 1 MG

## 2024-05-28 PROCEDURE — 25010000002 DEXAMETHASONE PER 1 MG: Performed by: ANESTHESIOLOGY

## 2024-05-28 PROCEDURE — 25010000002 BUPIVACAINE (PF) 0.25 % SOLUTION: Performed by: NURSE ANESTHETIST, CERTIFIED REGISTERED

## 2024-05-28 PROCEDURE — C1889 IMPLANT/INSERT DEVICE, NOC: HCPCS | Performed by: ORTHOPAEDIC SURGERY

## 2024-05-28 PROCEDURE — 25810000003 LACTATED RINGERS PER 1000 ML: Performed by: ANESTHESIOLOGY

## 2024-05-28 PROCEDURE — 25010000002 FENTANYL CITRATE (PF) 50 MCG/ML SOLUTION

## 2024-05-28 PROCEDURE — 25010000002 ONDANSETRON PER 1 MG: Performed by: ANESTHESIOLOGY

## 2024-05-28 DEVICE — INFINITY-LOCK 5
Type: IMPLANTABLE DEVICE | Site: SHOULDER | Status: FUNCTIONAL
Brand: INFINITY-LOCK 5

## 2024-05-28 DEVICE — TNDN SEMITNDNSUS BIOCLEANSE 4.5PLSMM 200TO219MM: Type: IMPLANTABLE DEVICE | Site: SHOULDER | Status: FUNCTIONAL

## 2024-05-28 DEVICE — SYS PREP SFT/TISS SPEEDTRAP GRFT SHT 20MM GRN/WHT: Type: IMPLANTABLE DEVICE | Site: SHOULDER | Status: FUNCTIONAL

## 2024-05-28 DEVICE — SYS PREP SFT/TISS SPEEDTRAP GRFT SHT 30MM WHT: Type: IMPLANTABLE DEVICE | Site: SHOULDER | Status: FUNCTIONAL

## 2024-05-28 DEVICE — IMPLANTABLE DEVICE: Type: IMPLANTABLE DEVICE | Site: SHOULDER | Status: FUNCTIONAL

## 2024-05-28 RX ORDER — MEPERIDINE HYDROCHLORIDE 25 MG/ML
12.5 INJECTION INTRAMUSCULAR; INTRAVENOUS; SUBCUTANEOUS
Status: DISCONTINUED | OUTPATIENT
Start: 2024-05-28 | End: 2024-05-28 | Stop reason: HOSPADM

## 2024-05-28 RX ORDER — ACETAMINOPHEN 500 MG
1000 TABLET ORAL ONCE
Status: COMPLETED | OUTPATIENT
Start: 2024-05-28 | End: 2024-05-28

## 2024-05-28 RX ORDER — SODIUM CHLORIDE 9 MG/ML
40 INJECTION, SOLUTION INTRAVENOUS AS NEEDED
Status: CANCELLED | OUTPATIENT
Start: 2024-05-28

## 2024-05-28 RX ORDER — ROCURONIUM BROMIDE 10 MG/ML
INJECTION, SOLUTION INTRAVENOUS AS NEEDED
Status: DISCONTINUED | OUTPATIENT
Start: 2024-05-28 | End: 2024-05-28 | Stop reason: SURG

## 2024-05-28 RX ORDER — ONDANSETRON 4 MG/1
4 TABLET, FILM COATED ORAL EVERY 8 HOURS PRN
Qty: 12 TABLET | Refills: 0 | Status: SHIPPED | OUTPATIENT
Start: 2024-05-28

## 2024-05-28 RX ORDER — MELOXICAM 15 MG/1
15 TABLET ORAL ONCE
Status: COMPLETED | OUTPATIENT
Start: 2024-05-28 | End: 2024-05-28

## 2024-05-28 RX ORDER — PROPOFOL 10 MG/ML
VIAL (ML) INTRAVENOUS AS NEEDED
Status: DISCONTINUED | OUTPATIENT
Start: 2024-05-28 | End: 2024-05-28 | Stop reason: SURG

## 2024-05-28 RX ORDER — SCOLOPAMINE TRANSDERMAL SYSTEM 1 MG/1
1 PATCH, EXTENDED RELEASE TRANSDERMAL ONCE
Status: DISCONTINUED | OUTPATIENT
Start: 2024-05-28 | End: 2024-05-28 | Stop reason: HOSPADM

## 2024-05-28 RX ORDER — ONDANSETRON 2 MG/ML
INJECTION INTRAMUSCULAR; INTRAVENOUS
Status: COMPLETED
Start: 2024-05-28 | End: 2024-05-28

## 2024-05-28 RX ORDER — SODIUM CHLORIDE 0.9 % (FLUSH) 0.9 %
10 SYRINGE (ML) INJECTION AS NEEDED
Status: CANCELLED | OUTPATIENT
Start: 2024-05-28

## 2024-05-28 RX ORDER — FENTANYL CITRATE 50 UG/ML
50 INJECTION, SOLUTION INTRAMUSCULAR; INTRAVENOUS
Status: DISCONTINUED | OUTPATIENT
Start: 2024-05-28 | End: 2024-05-28 | Stop reason: HOSPADM

## 2024-05-28 RX ORDER — ONDANSETRON 2 MG/ML
4 INJECTION INTRAMUSCULAR; INTRAVENOUS ONCE AS NEEDED
Status: COMPLETED | OUTPATIENT
Start: 2024-05-28 | End: 2024-05-28

## 2024-05-28 RX ORDER — BUPIVACAINE HYDROCHLORIDE 2.5 MG/ML
INJECTION, SOLUTION EPIDURAL; INFILTRATION; INTRACAUDAL
Status: COMPLETED | OUTPATIENT
Start: 2024-05-28 | End: 2024-05-28

## 2024-05-28 RX ORDER — FAMOTIDINE 20 MG/1
20 TABLET, FILM COATED ORAL ONCE
Status: COMPLETED | OUTPATIENT
Start: 2024-05-28 | End: 2024-05-28

## 2024-05-28 RX ORDER — HYDROCODONE BITARTRATE AND ACETAMINOPHEN 7.5; 325 MG/1; MG/1
1 TABLET ORAL EVERY 6 HOURS PRN
Qty: 24 TABLET | Refills: 0 | Status: SHIPPED | OUTPATIENT
Start: 2024-05-28

## 2024-05-28 RX ORDER — DEXAMETHASONE SODIUM PHOSPHATE 4 MG/ML
INJECTION, SOLUTION INTRA-ARTICULAR; INTRALESIONAL; INTRAMUSCULAR; INTRAVENOUS; SOFT TISSUE AS NEEDED
Status: DISCONTINUED | OUTPATIENT
Start: 2024-05-28 | End: 2024-05-28 | Stop reason: SURG

## 2024-05-28 RX ORDER — FAMOTIDINE 10 MG/ML
20 INJECTION, SOLUTION INTRAVENOUS ONCE
Status: CANCELLED | OUTPATIENT
Start: 2024-05-28 | End: 2024-05-28

## 2024-05-28 RX ORDER — DOCUSATE SODIUM 100 MG/1
100 CAPSULE, LIQUID FILLED ORAL 2 TIMES DAILY
Qty: 20 CAPSULE | Refills: 0 | Status: SHIPPED | OUTPATIENT
Start: 2024-05-28

## 2024-05-28 RX ORDER — SODIUM CHLORIDE 0.9 % (FLUSH) 0.9 %
10 SYRINGE (ML) INJECTION EVERY 12 HOURS SCHEDULED
Status: CANCELLED | OUTPATIENT
Start: 2024-05-28

## 2024-05-28 RX ORDER — LIDOCAINE HYDROCHLORIDE 10 MG/ML
INJECTION, SOLUTION EPIDURAL; INFILTRATION; INTRACAUDAL; PERINEURAL AS NEEDED
Status: DISCONTINUED | OUTPATIENT
Start: 2024-05-28 | End: 2024-05-28 | Stop reason: SURG

## 2024-05-28 RX ORDER — HYDROMORPHONE HYDROCHLORIDE 1 MG/ML
0.5 INJECTION, SOLUTION INTRAMUSCULAR; INTRAVENOUS; SUBCUTANEOUS
Status: DISCONTINUED | OUTPATIENT
Start: 2024-05-28 | End: 2024-05-28 | Stop reason: HOSPADM

## 2024-05-28 RX ORDER — FENTANYL CITRATE 50 UG/ML
INJECTION, SOLUTION INTRAMUSCULAR; INTRAVENOUS
Status: COMPLETED
Start: 2024-05-28 | End: 2024-05-28

## 2024-05-28 RX ORDER — NALOXONE HCL 0.4 MG/ML
0.4 VIAL (ML) INJECTION AS NEEDED
Status: DISCONTINUED | OUTPATIENT
Start: 2024-05-28 | End: 2024-05-28 | Stop reason: HOSPADM

## 2024-05-28 RX ORDER — EPHEDRINE SULFATE 50 MG/ML
5 INJECTION, SOLUTION INTRAVENOUS ONCE AS NEEDED
Status: DISCONTINUED | OUTPATIENT
Start: 2024-05-28 | End: 2024-05-28 | Stop reason: HOSPADM

## 2024-05-28 RX ORDER — SODIUM CHLORIDE, SODIUM LACTATE, POTASSIUM CHLORIDE, CALCIUM CHLORIDE 600; 310; 30; 20 MG/100ML; MG/100ML; MG/100ML; MG/100ML
INJECTION, SOLUTION INTRAVENOUS CONTINUOUS PRN
Status: DISCONTINUED | OUTPATIENT
Start: 2024-05-28 | End: 2024-05-28 | Stop reason: SURG

## 2024-05-28 RX ORDER — LIDOCAINE HYDROCHLORIDE 10 MG/ML
0.5 INJECTION, SOLUTION EPIDURAL; INFILTRATION; INTRACAUDAL; PERINEURAL ONCE AS NEEDED
Status: COMPLETED | OUTPATIENT
Start: 2024-05-28 | End: 2024-05-28

## 2024-05-28 RX ORDER — SODIUM CHLORIDE, SODIUM LACTATE, POTASSIUM CHLORIDE, CALCIUM CHLORIDE 600; 310; 30; 20 MG/100ML; MG/100ML; MG/100ML; MG/100ML
100 INJECTION, SOLUTION INTRAVENOUS CONTINUOUS
Status: DISCONTINUED | OUTPATIENT
Start: 2024-05-28 | End: 2024-05-28 | Stop reason: HOSPADM

## 2024-05-28 RX ORDER — SODIUM CHLORIDE, SODIUM LACTATE, POTASSIUM CHLORIDE, CALCIUM CHLORIDE 600; 310; 30; 20 MG/100ML; MG/100ML; MG/100ML; MG/100ML
9 INJECTION, SOLUTION INTRAVENOUS CONTINUOUS
Status: DISCONTINUED | OUTPATIENT
Start: 2024-05-28 | End: 2024-05-28 | Stop reason: HOSPADM

## 2024-05-28 RX ORDER — SUCCINYLCHOLINE/SOD CL,ISO/PF 200MG/10ML
SYRINGE (ML) INTRAVENOUS AS NEEDED
Status: DISCONTINUED | OUTPATIENT
Start: 2024-05-28 | End: 2024-05-28 | Stop reason: SURG

## 2024-05-28 RX ORDER — ROPIVACAINE HYDROCHLORIDE 2 MG/ML
INJECTION, SOLUTION EPIDURAL; INFILTRATION; PERINEURAL CONTINUOUS
Status: DISCONTINUED | OUTPATIENT
Start: 2024-05-28 | End: 2024-05-28 | Stop reason: HOSPADM

## 2024-05-28 RX ORDER — ONDANSETRON 2 MG/ML
INJECTION INTRAMUSCULAR; INTRAVENOUS AS NEEDED
Status: DISCONTINUED | OUTPATIENT
Start: 2024-05-28 | End: 2024-05-28 | Stop reason: SURG

## 2024-05-28 RX ORDER — MAGNESIUM HYDROXIDE 1200 MG/15ML
LIQUID ORAL AS NEEDED
Status: DISCONTINUED | OUTPATIENT
Start: 2024-05-28 | End: 2024-05-28 | Stop reason: HOSPADM

## 2024-05-28 RX ORDER — MIDAZOLAM HYDROCHLORIDE 1 MG/ML
1 INJECTION INTRAMUSCULAR; INTRAVENOUS
Status: DISCONTINUED | OUTPATIENT
Start: 2024-05-28 | End: 2024-05-28 | Stop reason: HOSPADM

## 2024-05-28 RX ORDER — FENTANYL CITRATE 50 UG/ML
INJECTION, SOLUTION INTRAMUSCULAR; INTRAVENOUS
Status: COMPLETED | OUTPATIENT
Start: 2024-05-28 | End: 2024-05-28

## 2024-05-28 RX ADMIN — BUPIVACAINE HYDROCHLORIDE 15 ML: 2.5 INJECTION, SOLUTION EPIDURAL; INFILTRATION; INTRACAUDAL; PERINEURAL at 11:12

## 2024-05-28 RX ADMIN — SODIUM CHLORIDE, POTASSIUM CHLORIDE, SODIUM LACTATE AND CALCIUM CHLORIDE 9 ML/HR: 600; 310; 30; 20 INJECTION, SOLUTION INTRAVENOUS at 10:20

## 2024-05-28 RX ADMIN — ACETAMINOPHEN 1000 MG: 500 TABLET ORAL at 10:26

## 2024-05-28 RX ADMIN — PROPOFOL 200 MG: 10 INJECTION, EMULSION INTRAVENOUS at 11:54

## 2024-05-28 RX ADMIN — Medication: at 14:10

## 2024-05-28 RX ADMIN — HYDROMORPHONE HYDROCHLORIDE 0.5 MG: 1 INJECTION, SOLUTION INTRAMUSCULAR; INTRAVENOUS; SUBCUTANEOUS at 15:13

## 2024-05-28 RX ADMIN — Medication 150 MG: at 11:54

## 2024-05-28 RX ADMIN — SCOPOLAMINE 1 PATCH: 1.5 PATCH, EXTENDED RELEASE TRANSDERMAL at 11:20

## 2024-05-28 RX ADMIN — LIDOCAINE HYDROCHLORIDE 0.5 ML: 10 INJECTION, SOLUTION EPIDURAL; INFILTRATION; INTRACAUDAL; PERINEURAL at 10:20

## 2024-05-28 RX ADMIN — ONDANSETRON 4 MG: 2 INJECTION INTRAMUSCULAR; INTRAVENOUS at 15:18

## 2024-05-28 RX ADMIN — LIDOCAINE HYDROCHLORIDE 50 MG: 10 INJECTION, SOLUTION EPIDURAL; INFILTRATION; INTRACAUDAL; PERINEURAL at 11:54

## 2024-05-28 RX ADMIN — DEXAMETHASONE SODIUM PHOSPHATE 4 MG: 4 INJECTION INTRA-ARTICULAR; INTRALESIONAL; INTRAMUSCULAR; INTRAVENOUS; SOFT TISSUE at 12:02

## 2024-05-28 RX ADMIN — FAMOTIDINE 20 MG: 20 TABLET, FILM COATED ORAL at 10:26

## 2024-05-28 RX ADMIN — PROPOFOL 15 MCG/KG/MIN: 10 INJECTION, EMULSION INTRAVENOUS at 12:13

## 2024-05-28 RX ADMIN — MELOXICAM 15 MG: 15 TABLET ORAL at 10:26

## 2024-05-28 RX ADMIN — ROCURONIUM BROMIDE 40 MG: 10 INJECTION INTRAVENOUS at 12:08

## 2024-05-28 RX ADMIN — FENTANYL CITRATE 50 MCG: 50 INJECTION, SOLUTION INTRAMUSCULAR; INTRAVENOUS at 14:52

## 2024-05-28 RX ADMIN — SUGAMMADEX 200 MG: 100 INJECTION, SOLUTION INTRAVENOUS at 13:30

## 2024-05-28 RX ADMIN — SODIUM CHLORIDE, POTASSIUM CHLORIDE, SODIUM LACTATE AND CALCIUM CHLORIDE: 600; 310; 30; 20 INJECTION, SOLUTION INTRAVENOUS at 11:51

## 2024-05-28 RX ADMIN — FENTANYL CITRATE 100 MCG: 50 INJECTION, SOLUTION INTRAMUSCULAR; INTRAVENOUS at 11:02

## 2024-05-28 RX ADMIN — ONDANSETRON 4 MG: 2 INJECTION INTRAMUSCULAR; INTRAVENOUS at 13:25

## 2024-05-28 RX ADMIN — ROCURONIUM BROMIDE 10 MG: 10 INJECTION INTRAVENOUS at 11:54

## 2024-05-28 RX ADMIN — SODIUM CHLORIDE 2000 MG: 900 INJECTION INTRAVENOUS at 12:01

## 2024-05-28 NOTE — ANESTHESIA PREPROCEDURE EVALUATION
Anesthesia Evaluation     Patient summary reviewed and Nursing notes reviewed   NPO Solid Status: > 8 hours  NPO Liquid Status: > 8 hours           Airway   Mallampati: I  TM distance: >3 FB  Neck ROM: full  No difficulty expected  Dental - normal exam     Pulmonary - normal exam   Cardiovascular   Exercise tolerance: good (4-7 METS)    Rhythm: regular  Rate: normal        Neuro/Psych  GI/Hepatic/Renal/Endo      Musculoskeletal     Abdominal    Substance History      OB/GYN          Other                    Anesthesia Plan    ASA 2     general with block     (IS block for post op pain control)    Anesthetic plan, risks, benefits, and alternatives have been provided, discussed and informed consent has been obtained with: patient.    CODE STATUS:

## 2024-05-28 NOTE — ANESTHESIA PROCEDURE NOTES
Airway  Urgency: elective    Date/Time: 5/28/2024 11:57 AM  Airway not difficult    General Information and Staff    Patient location during procedure: OR  CRNA/CAA: Chong Dorsey CRNA    Indications and Patient Condition  Indications for airway management: airway protection    Preoxygenated: yes  MILS not maintained throughout  Mask difficulty assessment: 0 - not attempted    Final Airway Details  Final airway type: endotracheal airway      Successful airway: ETT  Cuffed: yes   Successful intubation technique: direct laryngoscopy  Endotracheal tube insertion site: oral  Blade: Virgil  Blade size: 3  ETT size (mm): 7.0  Cormack-Lehane Classification: grade I - full view of glottis  Placement verified by: chest auscultation and capnometry   Cuff volume (mL): 6  Measured from: lips  ETT/EBT  to lips (cm): 21  Number of attempts at approach: 1  Assessment: lips, teeth, and gum same as pre-op and atraumatic intubation    Additional Comments  Negative epigastric sounds, Breath sound equal bilaterally with symmetric chest rise and fall

## 2024-05-28 NOTE — ANESTHESIA PROCEDURE NOTES
Peripheral Block      Patient reassessed immediately prior to procedure    Patient location during procedure: pre-op  Start time: 5/28/2024 11:02 AM  Stop time: 5/28/2024 11:12 AM  Reason for block: at surgeon's request and post-op pain management  Performed by  CRNA/CAA: Alejo Venegas CRNA  Assisted by: Sujey Girard RN  Preanesthetic Checklist  Completed: patient identified, IV checked, site marked, risks and benefits discussed, surgical consent, monitors and equipment checked, pre-op evaluation and timeout performed  Prep:  Pt Position: right lateral decubitus  Sterile barriers:cap, gloves, mask and washed/disinfected hands  Prep: ChloraPrep  Patient monitoring: blood pressure monitoring, continuous pulse oximetry and EKG  Procedure    Sedation: yes  Performed under: local infiltration  Guidance:ultrasound guided    ULTRASOUND INTERPRETATION.  Using ultrasound guidance a 20 G gauge needle was placed in close proximity to the nerve, at which point, under ultrasound guidance anesthetic was injected in the area of the nerve and spread of the anesthesia was seen on ultrasound in close proximity thereto.  There were no abnormalities seen on ultrasound; a digital image was taken; and the patient tolerated the procedure with no complications. Images:still images obtained, printed/placed on chart    Laterality:left  Block Type:interscalene  Injection Technique:catheter  Needle Type:Tuohy and echogenic  Needle Gauge:18 G  Resistance on Injection: none  Catheter Size:20 G (20g)  Cath Depth at skin: 7 cm    Medications Used: fentaNYL citrate (PF) (SUBLIMAZE) injection - Intravenous   100 mcg - 5/28/2024 11:02:00 AM  bupivacaine PF (MARCAINE) 0.25 % injection - Injection   15 mL - 5/28/2024 11:12:00 AM      Medications  Preservative Free Saline:5ml    Post Assessment  Injection Assessment: negative aspiration for heme, no paresthesia on injection and incremental injection  Patient Tolerance:comfortable throughout  "block  Complications:no  Additional Notes  CATHETER  A high-frequency linear transducer, with sterile cover, was placed in the supraclavicular fossa to identify the subclavian artery and trunks and divisions of the brachial plexus. The transducer was then moved in a cephalad orientation with a slight rotation to continue visualization of the brachial plexus from the trunks and divisions, on to the C5-C7 roots. The insertion site was prepped and draped in sterile fashion. Skin and cutaneous tissue was infiltrated with 2-5 ml of 1% Lidocaine. Using ultrasound-guidance, an 18-gauge Contiplex Ultra 360 Touhy needle was advanced in plane from lateral to medial. Preservative-free normal saline was utilized for hydro-dissection of tissue, advancement of Touhy, and to confirm final needle placement at the fascial plane between the middle scalene muscle and sheath of the brachial plexus (C5-C7). A 20-gauge Contiplex Echo catheter was placed through the needle and advance out the tip of the Touhy 3-5 cm with the \"Teixeira Flip\". The Touhy needle was then removed, and final catheter position verified lateral to the brachial plexus with local anesthetic (LA) and ultrasound visualization. The catheter was secured in the usual fashion with skin glue, benzoin, steri-strips, CHG tegaderm and label noting \"Nerve Block Catheter\". Jerk tape applied at yellow connector and catheter connection. All LA was injected in increments of 3-5 ml after catheter secured. Aspiration every 5 ml to prevent intravascular injection. Injection was completed with negative aspiration of blood and negative intravascular injection. Injection pressures were normal with minimal resistance.             "

## 2024-05-28 NOTE — DISCHARGE INSTRUCTIONS
InfuBLOCK - Patient Information    What is a pain pump?  The InfuBLOCK pump delivers post-operative, non-narcotic, numbing medication to the nerve near the surgical site for pain relief.     Where can I find information about my pain pump?           For more information about your pain pump, scan the QR code.  For additional patient resources, visit [email or web address].                                                                                               While your physician is your primary source for information about your treatment there may be times during your treatment that you need assistance with your infusion pump.     If you need assistance take the following steps:    The [employer] Nursing Hotline is Here for You 24/7.  Please call [phone] for the following concerns or complications:    Answers to questions about your infusion pump                 Tubing disconnect  Assistance with pump alarms                                                      Dislodged catheter  Excessive leakage noted from pump                                         Inadequate pain control    2.   [facility] Anest[facility] Acute Pain Service: [phone] is available 24/7 for any further needs or concerns about medication or pain control.       Nerve Catheter Removal Instructions  When your device is empty:    Remove your catheter by pulling the dressing off slowly (like you would remove a regular bandage). The catheter should pull right out of the skin.  Check that the BLUE tip is intact.                                                                                     If the catheter is stuck, reposition your   extremity and pull slowly until removed.  *If catheter is HURTING and WON'T come out, stop and call [phone] for further assistance.    Remove medication bag from the black carrying case.  Cut the tubing on right and left side of pump, and discard the medication bag and tubing  into garbage.  Place the pump and black carrying case into the plastic bag and then place this into the return box.  Seal box with blue stickers and return to [place] postal service. THIS IS PRE-PAID POSTAGE.

## 2024-05-28 NOTE — ANESTHESIA POSTPROCEDURE EVALUATION
Patient: Brock Bateman    Procedure Summary       Date: 05/28/24 Room / Location:  [facility]  /  [facility]    Anesthesia Start: 1151 Anesthesia Stop: 1355    Procedure: CORACOCLAVICULAR LIGAMENT / ACROMIOCLAVICULAR JOINT RECONSTRUCTION WITH SEMI-T ALLOGRAFT - LEFT (Left: Arm Upper) Diagnosis:     Surgeons: Tyree Ayala Jr., MD Provider: Jorden Monterroso MD    Anesthesia Type: general with block ASA Status: 2              Anesthesia Type: general with block    Vitals  No vitals data found for the desired time range.    117/83  95%  HR 82  RR 17  98.0      Post Anesthesia Care and Evaluation    Patient location during evaluation: PACU  Patient participation: complete - patient participated  Level of consciousness: awake and alert  Pain score: 0  Pain management: adequate    Airway patency: patent  Anesthetic complications: No anesthetic complications  PONV Status: none  Cardiovascular status: hemodynamically stable and acceptable  Respiratory status: nonlabored ventilation, acceptable and nasal cannula  Hydration status: acceptable
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
## 2024-05-29 NOTE — ADDENDUM NOTE
Addendum  created 05/29/24 1050 by Chong Dorsey, EDGARDO    Clinical Note Signed, Diagnosis association updated, Intraprocedure Blocks edited, SmartForm saved

## (undated) DEVICE — GLV SURG SENSICARE PI LF PF 7.0

## (undated) DEVICE — ENDOSCOPIC CANNULATED DRILL BIT,                                    4.5 MM, STERILE

## (undated) DEVICE — PCL SAFETY GUIDE WIRE REQUIRED                                    CALIBRATED GUIDE WIRE USED IN                                    CONJUNCTION WITH THE ACUFEX DIRECTOR                                    PCL SAFETY STOP TO INDICATE WHEN THE                                    TIP OF THE WIRE HAS PENETRATED THE                                    POSTERIOR CORTEX: Brand: ACUFEX

## (undated) DEVICE — DRP SURG U/DRP INVISISHIELD PA 48X52IN

## (undated) DEVICE — ANTIBACTERIAL UNDYED BRAIDED (POLYGLACTIN 910), SYNTHETIC ABSORBABLE SUTURE: Brand: COATED VICRYL

## (undated) DEVICE — BLANKT WARM LOWR/BDY 100X120CM

## (undated) DEVICE — INTENDED FOR TISSUE SEPARATION, AND OTHER PROCEDURES THAT REQUIRE A SHARP SURGICAL BLADE TO PUNCTURE OR CUT.: Brand: BARD-PARKER ® STAINLESS STEEL BLADES

## (undated) DEVICE — GLV SURG PREMIERPRO GAMMEX NEOPRN PF SZ8 GRN

## (undated) DEVICE — GLV SURG PREMIERPRO MIC LTX PF SZ7 BRN

## (undated) DEVICE — PATIENT RETURN ELECTRODE, SINGLE-USE, CONTACT QUALITY MONITORING, ADULT, WITH 9FT CORD, FOR PATIENTS WEIGING OVER 33LBS. (15KG): Brand: MEGADYNE

## (undated) DEVICE — BNDG ELAS CO-FLEX SLF ADHR 4IN5YD LF STRL

## (undated) DEVICE — POSTN HD UNIV

## (undated) DEVICE — Device

## (undated) DEVICE — PAD,ARMBOARD,CONV,FOAM,2X8X20",12PR/CS: Brand: MEDLINE

## (undated) DEVICE — PK MAJ SHLDR SPLT 10

## (undated) DEVICE — SYS CLS SKIN PREMIERPRO EXOFINFUSION 22CM

## (undated) DEVICE — INTENDED TO SUPPORT AND MAINTAIN THE POSITION OF AN ANESTHETIZED PATIENT DURING SURGERY: Brand: ERIN BEACH CHAIR FACE MASK

## (undated) DEVICE — GLV SURG PREMIERPRO MIC LTX PF SZ8 BRN

## (undated) DEVICE — GLV SURG SENSICARE PI LF PF 6.5

## (undated) DEVICE — KT PUMP INFUBLOCK MDL 2100 PMKITSOLIS

## (undated) DEVICE — SUT ETHLN 3/0 FS1 30IN 669H

## (undated) DEVICE — C-ARM: Brand: UNBRANDED

## (undated) DEVICE — PENCL SMOKE/EVAC MEGADYNE TELESCP 10FT

## (undated) DEVICE — NDL SUT NEEDLELOOP FREE MO/6 PK/12

## (undated) DEVICE — GLV SURG PREMIERPRO MIC LTX PF SZ7.5 BRN

## (undated) DEVICE — ARM SLING: Brand: DEROYAL

## (undated) DEVICE — GLV SURG SENSICARE PI LF PF 7.5

## (undated) DEVICE — CC-HOOK RIGHT

## (undated) DEVICE — GOWN,REINFORCE,POLY,SIRUS,BREATH SLV,XLG: Brand: MEDLINE

## (undated) DEVICE — TRAP FLD MINIVAC MEGADYNE 100ML

## (undated) DEVICE — CC-HOOK LEFT